# Patient Record
Sex: FEMALE | Race: WHITE | NOT HISPANIC OR LATINO | Employment: OTHER | URBAN - NONMETROPOLITAN AREA
[De-identification: names, ages, dates, MRNs, and addresses within clinical notes are randomized per-mention and may not be internally consistent; named-entity substitution may affect disease eponyms.]

---

## 2018-02-22 ENCOUNTER — OUTSIDE FACILITY SERVICE (OUTPATIENT)
Dept: FAMILY MEDICINE CLINIC | Facility: CLINIC | Age: 83
End: 2018-02-22

## 2018-02-22 PROCEDURE — 99309 SBSQ NF CARE MODERATE MDM 30: CPT | Performed by: NURSE PRACTITIONER

## 2018-02-27 ENCOUNTER — OUTSIDE FACILITY SERVICE (OUTPATIENT)
Dept: INTERNAL MEDICINE | Facility: CLINIC | Age: 83
End: 2018-02-27

## 2018-02-27 PROCEDURE — 99309 SBSQ NF CARE MODERATE MDM 30: CPT | Performed by: FAMILY MEDICINE

## 2018-03-27 ENCOUNTER — OUTSIDE FACILITY SERVICE (OUTPATIENT)
Dept: INTERNAL MEDICINE | Facility: CLINIC | Age: 83
End: 2018-03-27

## 2018-03-27 PROCEDURE — 99309 SBSQ NF CARE MODERATE MDM 30: CPT | Performed by: FAMILY MEDICINE

## 2018-04-12 ENCOUNTER — OUTSIDE FACILITY SERVICE (OUTPATIENT)
Dept: FAMILY MEDICINE CLINIC | Facility: CLINIC | Age: 83
End: 2018-04-12

## 2018-04-12 PROCEDURE — 99308 SBSQ NF CARE LOW MDM 20: CPT | Performed by: NURSE PRACTITIONER

## 2018-04-17 ENCOUNTER — OUTSIDE FACILITY SERVICE (OUTPATIENT)
Dept: INTERNAL MEDICINE | Facility: CLINIC | Age: 83
End: 2018-04-17

## 2018-04-17 PROCEDURE — 99309 SBSQ NF CARE MODERATE MDM 30: CPT | Performed by: FAMILY MEDICINE

## 2018-05-17 ENCOUNTER — OUTSIDE FACILITY SERVICE (OUTPATIENT)
Dept: FAMILY MEDICINE CLINIC | Facility: CLINIC | Age: 83
End: 2018-05-17

## 2018-05-17 PROCEDURE — 99308 SBSQ NF CARE LOW MDM 20: CPT | Performed by: NURSE PRACTITIONER

## 2018-05-31 ENCOUNTER — OUTSIDE FACILITY SERVICE (OUTPATIENT)
Dept: FAMILY MEDICINE CLINIC | Facility: CLINIC | Age: 83
End: 2018-05-31

## 2018-05-31 PROCEDURE — 99308 SBSQ NF CARE LOW MDM 20: CPT | Performed by: NURSE PRACTITIONER

## 2018-06-11 RX ORDER — FENTANYL 25 UG/H
1 PATCH TRANSDERMAL
Qty: 20 PATCH | Refills: 0 | Status: SHIPPED | OUTPATIENT
Start: 2018-06-11 | End: 2018-07-25 | Stop reason: SDUPTHER

## 2018-06-28 ENCOUNTER — OUTSIDE FACILITY SERVICE (OUTPATIENT)
Dept: FAMILY MEDICINE CLINIC | Facility: CLINIC | Age: 83
End: 2018-06-28

## 2018-06-28 PROCEDURE — 99308 SBSQ NF CARE LOW MDM 20: CPT | Performed by: NURSE PRACTITIONER

## 2018-07-09 ENCOUNTER — NURSING HOME (OUTPATIENT)
Dept: FAMILY MEDICINE CLINIC | Facility: CLINIC | Age: 83
End: 2018-07-09

## 2018-07-09 DIAGNOSIS — R53.81 PHYSICAL DEBILITY: Chronic | ICD-10-CM

## 2018-07-09 DIAGNOSIS — I10 ESSENTIAL HYPERTENSION: Chronic | ICD-10-CM

## 2018-07-09 DIAGNOSIS — I69.320 APHASIA AS LATE EFFECT OF CEREBROVASCULAR ACCIDENT (CVA): Chronic | ICD-10-CM

## 2018-07-09 DIAGNOSIS — R13.12 OROPHARYNGEAL DYSPHAGIA: Primary | Chronic | ICD-10-CM

## 2018-07-09 DIAGNOSIS — Z74.09 IMPAIRED MOBILITY AND ADLS: Chronic | ICD-10-CM

## 2018-07-09 DIAGNOSIS — Z78.9 IMPAIRED MOBILITY AND ADLS: Chronic | ICD-10-CM

## 2018-07-09 DIAGNOSIS — I69.351 HEMIPARESIS AFFECTING RIGHT SIDE AS LATE EFFECT OF CEREBROVASCULAR ACCIDENT (CVA) (HCC): Chronic | ICD-10-CM

## 2018-07-09 PROCEDURE — 99309 SBSQ NF CARE MODERATE MDM 30: CPT | Performed by: FAMILY MEDICINE

## 2018-07-11 PROBLEM — I69.320 APHASIA AS LATE EFFECT OF CEREBROVASCULAR ACCIDENT (CVA): Chronic | Status: ACTIVE | Noted: 2018-07-11

## 2018-07-11 PROBLEM — I10 ESSENTIAL HYPERTENSION: Chronic | Status: ACTIVE | Noted: 2018-07-11

## 2018-07-11 PROBLEM — I69.351 HEMIPARESIS AFFECTING RIGHT SIDE AS LATE EFFECT OF CEREBROVASCULAR ACCIDENT (CVA) (HCC): Chronic | Status: ACTIVE | Noted: 2018-07-11

## 2018-07-11 PROBLEM — Z78.9 IMPAIRED MOBILITY AND ADLS: Chronic | Status: ACTIVE | Noted: 2018-07-11

## 2018-07-11 PROBLEM — Z74.09 IMPAIRED MOBILITY AND ADLS: Chronic | Status: ACTIVE | Noted: 2018-07-11

## 2018-07-11 PROBLEM — R13.12 OROPHARYNGEAL DYSPHAGIA: Chronic | Status: ACTIVE | Noted: 2018-07-11

## 2018-07-11 PROBLEM — R53.81 PHYSICAL DEBILITY: Chronic | Status: ACTIVE | Noted: 2018-07-11

## 2018-07-11 NOTE — PROGRESS NOTES
[facility]  Skilled Nursing Facility    Patient Name: Luba Landaverde    : 1935     DOS: 2018    Nursing Facility: []   [facility]  []   [facility]  [x]   [facility]  []   [facility]    Subjective     Chief Complaint:  CMM/LTC    History of Present Illness:   [x]  Follow Up visit for coordination of long term care issues and chronic medical management needs.    CVA with late effects/Imp Mob and ADLs/HTN/GERD/OP dysphagia/debility      Review of Systems:  [x]  A complete ROS was performed. All were (-).  []  A complete ROS was performed. All were (-) except:     Review of Systems    1. Constitutional:  2. HENT:  3. Eyes:  4. Respiratory:  5. Cardiovascular:  6. Gastrointestinal:  7. Endocrine:  8. Genitourinary:  9. Musculoskeletal:  10. Skin:  11. Neurological:  12. Hematological:  13. Psychiatric/Behavioral:    Past Medical History: No past medical history on file. Reviewed at facility    Past Surgical History:No past surgical history on file. Reviewed at facility    Family History: family history is not on file. Reviewed at facility    Social History:   Reviewed at facility    Allergies:  Allergies not on file Reviewed at facility, no new listings     [x]  History reviewed at skilled nursing facility.    Objective     Vital Signs:  138/70, 76 bpm, RR 20                                            LPM: ____  Weight: 133    Physical Exam:  General:   [x]  Alert   [x]  Oriented x person  [x]  No acute distress    [x]  Confused  []  Disoriented   []  Comatose      HEENT:   [x] Atraumatic  [x]  PEERLA  [x]  Oral mucosa pink and moist                Neck:      [x]  Nares patent without epistaxis  [x]  External auditory canals are patent     [x]  Tympanic membranes intact      [x]  Supple [x]  No JVD [x]  Trachea midline [x]  No thyromegaly      Psych:  [x]  Mood _________  [x]  No acute changes []  Depressed     Heart::   [x]  RRR  []  IRRR  []  Murmur ___________     Pedal Pulses:    [x]   Present  []  Absent      Lungs:   [x]   CTA  [x]  Unlabored breathing effort []  Rales, Location ___________       Abdomen:    [x]   Soft, BS x 4, NT/ND  []  Distended  []  Tender __________       Skin:  [x]  Intact, warm and dry  []  Pressure Ulcer, Location _______________     Extremities:               Neuro:     [x]  No clubbing [x]  No cyanosis  [x]  No edema    []  Good ROM actively and passively [] Symmetric muscle strength and                                                                 development     []  Edema, Location  []  Pitting  Frail build    [x]  Normal speech []  Normal mental status [x] Cranial nerves II through                                                                                XII intact   [x]  No anosmia [x]  DTR 2+ [x]  Proprioception intact    []  No focal motor/sensory deficits  Chronic N/M def of known CVA       Pain:  []  None  [x]  Pain noted w/pain management in place       Urinary:                []  Continent  [x]  Incontinent  []  Retention  []  F/C     []  UTI w/treatment in progress              Appetite:  []  Fair  [x]  Good  []  Poor  [x]  Weight Stable     []  Weightloss  []  Unavoidable Weight Loss     []  Supplements Provided  []  Tolerating Tube Feeding              Assessment / Plan     Assessment/Problem List:    Patient Active Problem List   Diagnosis   • Hemiparesis affecting right side as late effect of cerebrovascular accident (CVA) (CMS/HCC)   • Oropharyngeal dysphagia   • Essential hypertension   • Aphasia as late effect of cerebrovascular accident (CVA)   • Impaired mobility and ADLs   • Physical debility       Plan:  Planned continuation of SC; no rep of focal ASP, cont precautions; BP at goal; mood relatively stable, cont CBT as needed for anxiety.      [x] Medication Review: I have reviewed the patients active and prn medications at Skilled Nursing Facility      []   PT/OT Reviewed   []  Order Changes     [x] I have personally reviewed and interpreted  available lab data, radiology studies and ECG obtained at time of visit.       []   Discharge Plans Reviewed      [x]  Plan of Care Reviewed  []  Discharge Summary Reviewed      [x]  Discussed Patient in detail with nursing/staff, addressed all needs today.    Discussed plan of care in detail with pt today; pt verb understanding and agrees; counseled for approx 15 min of total 25 min total exam time.    Morales Chris DO 07/11/18 12:58 PM    EMR Dragon/Transcription disclaimer:   Much of this encounter note is an electronic transcription/translation of spoken language to printed text. The electronic translation of spoken language may permit erroneous, or at times, nonsensical words or phrases to be inadvertently transcribed; Although I have reviewed the note for such errors, some may still exist.

## 2018-07-16 ENCOUNTER — NURSING HOME (OUTPATIENT)
Dept: FAMILY MEDICINE CLINIC | Facility: CLINIC | Age: 83
End: 2018-07-16

## 2018-07-16 DIAGNOSIS — I69.351 HEMIPARESIS AFFECTING RIGHT SIDE AS LATE EFFECT OF CEREBROVASCULAR ACCIDENT (CVA) (HCC): Chronic | ICD-10-CM

## 2018-07-16 DIAGNOSIS — I69.320 APHASIA AS LATE EFFECT OF CEREBROVASCULAR ACCIDENT (CVA): Chronic | ICD-10-CM

## 2018-07-16 DIAGNOSIS — R53.81 PHYSICAL DEBILITY: Chronic | ICD-10-CM

## 2018-07-16 DIAGNOSIS — Z93.1 GASTROSTOMY STATUS (HCC): Chronic | ICD-10-CM

## 2018-07-16 DIAGNOSIS — Z74.09 IMPAIRED MOBILITY AND ADLS: Primary | Chronic | ICD-10-CM

## 2018-07-16 DIAGNOSIS — Z78.9 IMPAIRED MOBILITY AND ADLS: Primary | Chronic | ICD-10-CM

## 2018-07-16 DIAGNOSIS — R13.12 OROPHARYNGEAL DYSPHAGIA: Chronic | ICD-10-CM

## 2018-07-16 DIAGNOSIS — I10 ESSENTIAL HYPERTENSION: Chronic | ICD-10-CM

## 2018-07-16 PROCEDURE — 99309 SBSQ NF CARE MODERATE MDM 30: CPT | Performed by: FAMILY MEDICINE

## 2018-07-18 PROBLEM — Z93.1 GASTROSTOMY STATUS (HCC): Chronic | Status: ACTIVE | Noted: 2018-07-18

## 2018-07-18 NOTE — PROGRESS NOTES
Nursing Home Progress Note      Patient Name: Luba Landaverde   YOB: 1935    Date of Service: 07/16/2018      Facility: [place] []   [facility] []   [facility] []   [facility] [x]   Other []       CHIEF COMPLAINT:  CMM/LTC     HISTORY OF PRESENT ILLNESS:  [x]  Follow Up visit for coordination of long term care issues and chronic medical management needs.    Hypertension/oral pharyngeal dysphasia/hemiparesis of right side secondary to CVA/aphasia secondary to CVA/impaired mobility and ADLs    PAST MEDICAL & SURGICAL HISTORY:  No past medical history on file.   No past surgical history on file.     MEDICATIONS:  Medication administration record for Luba Landaverde reviewed and reconciled today.    ALLERGIES:  Allergies not on file     REVIEW OF SYSTEMS:  • Appetite: Fair []   Good []   Poor []   Weight Loss []  []  Weight Stable   Unavoidable Weight Loss []  Tolerating Tube Feeding [x]    Supplements Provided []   • Constitutional: Negative for fever, chills, diaphoresis or fatigue and weight change.   • HENT: No dysphagia; no changes to vision/hearing/smell/taste; no epistaxis  • Eyes: Negative for redness and visual disturbance.   • Respiratory: Negative for shortness of breath, chest pain, cough or chest tightness.   • Cardiovascular: Negative for chest pain and palpitations.   • Gastrointestinal: Negative for abdominal distention, abdominal pain and blood in stool.   • Endocrine: Negative for cold intolerance and heat intolerance.   • Genitourinary: Negative for difficulty urinating, dysuria and frequency.Negative for hematuria   • Musculoskeletal: Chronic myalgias and arthralgias  • Integumentary: No open wounds, rash or concerning skin lesions  • Neurological: Negative for syncope, weakness and headaches.   • Hematological: Negative for adenopathy. Does not bruise/bleed easily.   • Immunological: Negative for reported allergies or immunological disorders  • Psychological: No depression, anxiety or  suicidal ideation    PHYSICAL EXAMINATION:  VITAL SIGNS: /64, HR 80 bpm, RR 18, weight 132    General Appearance:  [x]  Alert   [x]  Oriented x person  [x]  No acute distress    [x]  Confused  []  Disoriented   []  Comatose   Head:  Atraumatic and normocephalic, without obvious abnormality.   Eyes:          PERRLA, conjunctivae and sclerae normal, no Icterus. No pallor. Extra-occular movements are within normal limits.   Ears:  Ears appear intact with no abnormalities noted.   Throat: No oral lesions, no thrush, oral mucosa moist.   Neck: Supple, trachea midline, no thyromegaly, no carotid bruit.   Back:   No kyphoscoliosis. No tenderness to palpation.   Lungs:   Chest shape is normal.  Audible air exchange noted all lung fields.  No wheezing.    Heart:  Normal S1 and S2, no murmur, no gallop, no rub. No JVD.   Abdomen:   Normal bowel sounds, no masses, no organomegaly. Soft, non-tender, non-distended, no guarding .  G-tube functioning and in good position.     Extremities: Chronic neuromuscular deficits of known CVA, cyanosis or clubbing.  Frail build.    Pulses: Pulses palpable and equal bilaterally.   Skin: No bleeding or rash.  Generalized dry skin noted.  Age-related atrophy of skin.   Neurologic: [] Normal speech []  Normal mental status    [x] Cranial nerves II through XII intact   [x]  No anosmia [x]  DTR 2+ [x]  Proprioception intact  []  No focal motor/sensory deficits  Chronic neuromuscular/aphasic deficits of CVA     Psych/Mood:        [x]  No acute changes []  Depressed  Urinary:        []  Continent  [x]  Incontinent  []  Retention  []  F/C     []  UTI w/treatment in progress  RECORD REVIEW:   • Consult notes []   • Admission and subsequent notes []   •  [x] I have personally reviewed and interpreted available lab data, radiology studies and ECG obtained at time of visit.  [x] Medication Review: I have reviewed the patients active and prn medications at [facility]     ASSESSMENT    Diagnoses and all orders for this visit:    Impaired mobility and ADLs    Hemiparesis affecting right side as late effect of cerebrovascular accident (CVA) (CMS/HCC)    Oropharyngeal dysphagia    Essential hypertension    Aphasia as late effect of cerebrovascular accident (CVA)    Physical debility    •     PLAN  Continue supportive care as needed for ADLs/mobility/transfers.  Tolerating tube feeding regimen without difficulty at this time, no reports of residuals.  No reports of any focal aspirations, continue aspiration precautions.  Blood pressure is at goal.  Routine surveillance labs as needed.    [x]  Discussed Patient in detail with nursing/staff, addressed all needs today.     [x]  Plan of Care Reviewed   []   PT/OT Reviewed   []  Order Changes  []   Discharge Plans Reviewed         Total face to face time spent with patient 25 minutes, 15 min in counseling.    Morales Chris DO.  7/18/2018

## 2018-07-23 ENCOUNTER — NURSING HOME (OUTPATIENT)
Dept: FAMILY MEDICINE CLINIC | Facility: CLINIC | Age: 83
End: 2018-07-23

## 2018-07-23 DIAGNOSIS — I10 ESSENTIAL HYPERTENSION: Chronic | ICD-10-CM

## 2018-07-23 DIAGNOSIS — Z78.9 IMPAIRED MOBILITY AND ADLS: Chronic | ICD-10-CM

## 2018-07-23 DIAGNOSIS — I69.351 HEMIPARESIS AFFECTING RIGHT SIDE AS LATE EFFECT OF CEREBROVASCULAR ACCIDENT (CVA) (HCC): Chronic | ICD-10-CM

## 2018-07-23 DIAGNOSIS — Z74.09 IMPAIRED MOBILITY AND ADLS: Chronic | ICD-10-CM

## 2018-07-23 DIAGNOSIS — I69.320 APHASIA AS LATE EFFECT OF CEREBROVASCULAR ACCIDENT (CVA): Primary | Chronic | ICD-10-CM

## 2018-07-23 DIAGNOSIS — R13.12 OROPHARYNGEAL DYSPHAGIA: Chronic | ICD-10-CM

## 2018-07-23 DIAGNOSIS — Z93.1 GASTROSTOMY STATUS (HCC): Chronic | ICD-10-CM

## 2018-07-23 DIAGNOSIS — R53.81 PHYSICAL DEBILITY: Chronic | ICD-10-CM

## 2018-07-23 PROCEDURE — 99309 SBSQ NF CARE MODERATE MDM 30: CPT | Performed by: FAMILY MEDICINE

## 2018-07-25 RX ORDER — FENTANYL 25 UG/H
1 PATCH TRANSDERMAL
Qty: 20 PATCH | Refills: 0 | Status: SHIPPED | OUTPATIENT
Start: 2018-07-25 | End: 2018-10-08 | Stop reason: SDUPTHER

## 2018-07-25 NOTE — PROGRESS NOTES
Nursing Home Progress Note      Patient Name: Luba Landaverde   YOB: 1935    Date of Service: 07/23/2018      Facility: [place] []   [place] []   [facility] []   [facility] [x]   Other []       CHIEF COMPLAINT:  CMM/LTC     HISTORY OF PRESENT ILLNESS:  [x]  Follow Up visit for coordination of long term care issues and chronic medical management needs.    Hypertension/oral pharyngeal dysphasia/hemiparesis of right side secondary to CVA/aphasia secondary to CVA/impaired mobility and ADLs    PAST MEDICAL & SURGICAL HISTORY:  No past medical history on file.   No past surgical history on file.     MEDICATIONS:  Medication administration record for Luba Landaverde reviewed and reconciled today.    ALLERGIES:  Allergies not on file     REVIEW OF SYSTEMS:  • Appetite: Fair []   Good []   Poor []   Weight Loss []  []  Weight Stable   Unavoidable Weight Loss []  Tolerating Tube Feeding [x]    Supplements Provided []   • Constitutional: Negative for fever, chills, diaphoresis or fatigue and weight change.   • HENT: No dysphagia; no changes to vision/hearing/smell/taste; no epistaxis  • Eyes: Negative for redness and visual disturbance.   • Respiratory: Negative for shortness of breath, chest pain, cough or chest tightness.   • Cardiovascular: Negative for chest pain and palpitations.   • Gastrointestinal: Negative for abdominal distention, abdominal pain and blood in stool.   • Endocrine: Negative for cold intolerance and heat intolerance.   • Genitourinary: Negative for difficulty urinating, dysuria and frequency.Negative for hematuria   • Musculoskeletal: Chronic myalgias and arthralgias  • Integumentary: No open wounds, rash or concerning skin lesions  • Neurological: Negative for syncope, weakness and headaches.   • Hematological: Negative for adenopathy. Does not bruise/bleed easily.   • Immunological: Negative for reported allergies or immunological disorders  • Psychological: No depression, anxiety or  suicidal ideation    PHYSICAL EXAMINATION:  VITAL SIGNS: /70, HR 76 bpm, RR 20, weight 132    General Appearance:  [x]  Alert   [x]  Oriented x person  [x]  No acute distress    [x]  Confused  []  Disoriented   []  Comatose   Head:  Atraumatic and normocephalic, without obvious abnormality.   Eyes:          PERRLA, conjunctivae and sclerae normal, no Icterus. No pallor. Extra-occular movements are within normal limits.   Ears:  Ears appear intact with no abnormalities noted.   Throat: No oral lesions, no thrush, oral mucosa moist.   Neck: Supple, trachea midline, no thyromegaly, no carotid bruit.   Back:   No kyphoscoliosis. No tenderness to palpation.   Lungs:   Chest shape is normal.  Audible air exchange noted all lung fields.  No wheezing.    Heart:  Normal S1 and S2, no murmur, no gallop, no rub. No JVD.   Abdomen:   Normal bowel sounds, no masses, no organomegaly. Soft, non-tender, non-distended, no guarding .  G-tube functioning and in good position.     Extremities: Chronic neuromuscular deficits of known CVA, cyanosis or clubbing.  Frail build.    Pulses: Pulses palpable and equal bilaterally.   Skin: No bleeding or rash.  Generalized dry skin noted.  Age-related atrophy of skin.   Neurologic: [] Normal speech []  Normal mental status    [x] Cranial nerves II through XII intact   [x]  No anosmia [x]  DTR 2+ [x]  Proprioception intact  []  No focal motor/sensory deficits  Chronic neuromuscular/aphasic deficits of CVA     Psych/Mood:        [x]  No acute changes []  Depressed  Urinary:        []  Continent  [x]  Incontinent  []  Retention  []  F/C     []  UTI w/treatment in progress  RECORD REVIEW:   • Consult notes []   • Admission and subsequent notes []   •  [x] I have personally reviewed and interpreted available lab data, radiology studies and ECG obtained at time of visit.  [x] Medication Review: I have reviewed the patients active and prn medications at [facility]     ASSESSMENT    Diagnoses and all orders for this visit:    Aphasia as late effect of cerebrovascular accident (CVA)    Hemiparesis affecting right side as late effect of cerebrovascular accident (CVA) (CMS/HCC)    Impaired mobility and ADLs    Physical debility    Gastrostomy status (CMS/HCC)    Oropharyngeal dysphagia    Essential hypertension        PLAN  Continue supportive care as needed for ADLs/mobility/transfers.  Tolerating tube feeding regimen without difficulty at this time, no reports of residuals.  No reports of any focal aspirations, continue aspiration precautions.  Blood pressure is at goal.  Routine surveillance labs.    [x]  Discussed Patient in detail with nursing/staff, addressed all needs today.     [x]  Plan of Care Reviewed   []   PT/OT Reviewed   []  Order Changes  []   Discharge Plans Reviewed         Total face to face time spent with patient 25 minutes, 15 min in counseling.    Morales Chris DO.  7/25/2018

## 2018-07-30 ENCOUNTER — NURSING HOME (OUTPATIENT)
Dept: FAMILY MEDICINE CLINIC | Facility: CLINIC | Age: 83
End: 2018-07-30

## 2018-07-30 DIAGNOSIS — Z74.09 IMPAIRED MOBILITY AND ADLS: Chronic | ICD-10-CM

## 2018-07-30 DIAGNOSIS — R13.12 OROPHARYNGEAL DYSPHAGIA: Chronic | ICD-10-CM

## 2018-07-30 DIAGNOSIS — I69.351 HEMIPARESIS AFFECTING RIGHT SIDE AS LATE EFFECT OF CEREBROVASCULAR ACCIDENT (CVA) (HCC): Primary | Chronic | ICD-10-CM

## 2018-07-30 DIAGNOSIS — Z78.9 IMPAIRED MOBILITY AND ADLS: Chronic | ICD-10-CM

## 2018-07-30 DIAGNOSIS — I69.320 APHASIA AS LATE EFFECT OF CEREBROVASCULAR ACCIDENT (CVA): Chronic | ICD-10-CM

## 2018-07-30 DIAGNOSIS — Z93.1 GASTROSTOMY STATUS (HCC): Chronic | ICD-10-CM

## 2018-07-30 DIAGNOSIS — I10 ESSENTIAL HYPERTENSION: Chronic | ICD-10-CM

## 2018-07-30 DIAGNOSIS — R53.81 PHYSICAL DEBILITY: Chronic | ICD-10-CM

## 2018-07-30 PROCEDURE — 99309 SBSQ NF CARE MODERATE MDM 30: CPT | Performed by: FAMILY MEDICINE

## 2018-08-08 ENCOUNTER — NURSING HOME (OUTPATIENT)
Dept: FAMILY MEDICINE CLINIC | Facility: CLINIC | Age: 83
End: 2018-08-08

## 2018-08-08 DIAGNOSIS — R53.81 PHYSICAL DEBILITY: Chronic | ICD-10-CM

## 2018-08-08 DIAGNOSIS — Z78.9 IMPAIRED MOBILITY AND ADLS: Chronic | ICD-10-CM

## 2018-08-08 DIAGNOSIS — I69.320 APHASIA AS LATE EFFECT OF CEREBROVASCULAR ACCIDENT (CVA): Chronic | ICD-10-CM

## 2018-08-08 DIAGNOSIS — Z74.09 IMPAIRED MOBILITY AND ADLS: Chronic | ICD-10-CM

## 2018-08-08 DIAGNOSIS — Z93.1 GASTROSTOMY STATUS (HCC): Chronic | ICD-10-CM

## 2018-08-08 DIAGNOSIS — I10 ESSENTIAL HYPERTENSION: Chronic | ICD-10-CM

## 2018-08-08 DIAGNOSIS — I69.351 HEMIPARESIS AFFECTING RIGHT SIDE AS LATE EFFECT OF CEREBROVASCULAR ACCIDENT (CVA) (HCC): Primary | Chronic | ICD-10-CM

## 2018-08-08 DIAGNOSIS — R13.12 OROPHARYNGEAL DYSPHAGIA: Chronic | ICD-10-CM

## 2018-08-08 PROCEDURE — 99309 SBSQ NF CARE MODERATE MDM 30: CPT | Performed by: FAMILY MEDICINE

## 2018-08-15 ENCOUNTER — NURSING HOME (OUTPATIENT)
Dept: FAMILY MEDICINE CLINIC | Facility: CLINIC | Age: 83
End: 2018-08-15

## 2018-08-15 DIAGNOSIS — I10 ESSENTIAL HYPERTENSION: Chronic | ICD-10-CM

## 2018-08-15 DIAGNOSIS — I69.351 HEMIPARESIS AFFECTING RIGHT SIDE AS LATE EFFECT OF CEREBROVASCULAR ACCIDENT (CVA) (HCC): Chronic | ICD-10-CM

## 2018-08-15 DIAGNOSIS — I69.320 APHASIA AS LATE EFFECT OF CEREBROVASCULAR ACCIDENT (CVA): Primary | Chronic | ICD-10-CM

## 2018-08-15 DIAGNOSIS — R13.12 OROPHARYNGEAL DYSPHAGIA: Chronic | ICD-10-CM

## 2018-08-15 DIAGNOSIS — Z74.09 IMPAIRED MOBILITY AND ADLS: Chronic | ICD-10-CM

## 2018-08-15 DIAGNOSIS — Z93.1 GASTROSTOMY STATUS (HCC): Chronic | ICD-10-CM

## 2018-08-15 DIAGNOSIS — Z78.9 IMPAIRED MOBILITY AND ADLS: Chronic | ICD-10-CM

## 2018-08-15 DIAGNOSIS — R53.81 PHYSICAL DEBILITY: Chronic | ICD-10-CM

## 2018-08-15 PROCEDURE — 99309 SBSQ NF CARE MODERATE MDM 30: CPT | Performed by: FAMILY MEDICINE

## 2018-08-20 ENCOUNTER — OUTSIDE FACILITY SERVICE (OUTPATIENT)
Dept: FAMILY MEDICINE CLINIC | Facility: CLINIC | Age: 83
End: 2018-08-20

## 2018-08-20 PROCEDURE — 99308 SBSQ NF CARE LOW MDM 20: CPT | Performed by: NURSE PRACTITIONER

## 2018-09-05 ENCOUNTER — NURSING HOME (OUTPATIENT)
Dept: FAMILY MEDICINE CLINIC | Facility: CLINIC | Age: 83
End: 2018-09-05

## 2018-09-05 DIAGNOSIS — I63.511 ACUTE ISCHEMIC RIGHT MCA STROKE (HCC): ICD-10-CM

## 2018-09-05 DIAGNOSIS — Z74.09 IMPAIRED MOBILITY AND ADLS: Chronic | ICD-10-CM

## 2018-09-05 DIAGNOSIS — R13.12 OROPHARYNGEAL DYSPHAGIA: Chronic | ICD-10-CM

## 2018-09-05 DIAGNOSIS — Z78.9 IMPAIRED MOBILITY AND ADLS: Chronic | ICD-10-CM

## 2018-09-05 DIAGNOSIS — R53.81 PHYSICAL DEBILITY: Chronic | ICD-10-CM

## 2018-09-05 DIAGNOSIS — I69.320 APHASIA AS LATE EFFECT OF CEREBROVASCULAR ACCIDENT (CVA): Chronic | ICD-10-CM

## 2018-09-05 DIAGNOSIS — I10 ESSENTIAL HYPERTENSION: Chronic | ICD-10-CM

## 2018-09-05 DIAGNOSIS — I69.351 HEMIPARESIS AFFECTING RIGHT SIDE AS LATE EFFECT OF CEREBROVASCULAR ACCIDENT (CVA) (HCC): Primary | Chronic | ICD-10-CM

## 2018-09-05 DIAGNOSIS — Z93.1 GASTROSTOMY STATUS (HCC): Chronic | ICD-10-CM

## 2018-09-05 PROCEDURE — 99308 SBSQ NF CARE LOW MDM 20: CPT | Performed by: FAMILY MEDICINE

## 2018-09-12 NOTE — PROGRESS NOTES
Nursing Home Progress Note      Patient Name: Luba Landaverde   YOB: 1935    Date of Service: 07/30/2018      Facility: [place] []   [place] []   [facility] []   [facility] [x]   Other []       CHIEF COMPLAINT:  CMM/LTC     HISTORY OF PRESENT ILLNESS:  [x]  Follow Up visit for coordination of long term care issues and chronic medical management needs.    Hypertension/oral pharyngeal dysphasia/hemiparesis of right side secondary to CVA/aphasia secondary to CVA/impaired mobility and ADLs    PAST MEDICAL & SURGICAL HISTORY:  No past medical history on file.   No past surgical history on file.     MEDICATIONS:  Medication administration record for Luba Landaverde reviewed and reconciled today.    ALLERGIES:  Allergies not on file     REVIEW OF SYSTEMS:  • Appetite: Fair []   Good []   Poor []   Weight Loss []  []  Weight Stable   Unavoidable Weight Loss []  Tolerating Tube Feeding [x]    Supplements Provided []   • Constitutional: Negative for fever, chills, diaphoresis or fatigue and weight change.   • HENT: No dysphagia; no changes to vision/hearing/smell/taste; no epistaxis  • Eyes: Negative for redness and visual disturbance.   • Respiratory: Negative for shortness of breath, chest pain, cough or chest tightness.   • Cardiovascular: Negative for chest pain and palpitations.   • Gastrointestinal: Negative for abdominal distention, abdominal pain and blood in stool.   • Endocrine: Negative for cold intolerance and heat intolerance.   • Genitourinary: Negative for difficulty urinating, dysuria and frequency.Negative for hematuria   • Musculoskeletal: Chronic myalgias and arthralgias  • Integumentary: No open wounds, rash or concerning skin lesions  • Neurological: Negative for syncope, weakness and headaches.   • Hematological: Negative for adenopathy. Does not bruise/bleed easily.   • Immunological: Negative for reported allergies or immunological disorders  • Psychological: No depression, anxiety or  suicidal ideation    PHYSICAL EXAMINATION:  VITAL SIGNS: /64, HR 72 bpm, RR 18, weight 128    General Appearance:  [x]  Alert   [x]  Oriented x person  [x]  No acute distress    [x]  Confused  []  Disoriented   []  Comatose   Head:  Atraumatic and normocephalic, without obvious abnormality.   Eyes:          PERRLA, conjunctivae and sclerae normal, no Icterus. No pallor. Extra-occular movements are within normal limits.   Ears:  Ears appear intact with no abnormalities noted.   Throat: No oral lesions, no thrush, oral mucosa moist.   Neck: Supple, trachea midline, no thyromegaly, no carotid bruit.   Back:   No kyphoscoliosis. No tenderness to palpation.   Lungs:   Chest shape is normal.  Audible air exchange noted all lung fields.  No wheezing.    Heart:  Normal S1 and S2, no murmur, no gallop, no rub. No JVD.   Abdomen:   Normal bowel sounds, no masses, no organomegaly. Soft, non-tender, non-distended, no guarding .  G-tube functioning and in good position.     Extremities: Chronic neuromuscular deficits of known CVA, cyanosis or clubbing.  Frail build.    Pulses: Pulses palpable and equal bilaterally.   Skin: No bleeding or rash.  Generalized dry skin noted.  Age-related atrophy of skin.   Neurologic: [] Normal speech []  Normal mental status    [x] Cranial nerves II through XII intact   [x]  No anosmia [x]  DTR 2+ [x]  Proprioception intact  []  No focal motor/sensory deficits  Chronic neuromuscular/aphasic deficits of CVA     Psych/Mood:        [x]  No acute changes []  Depressed  Urinary:        []  Continent  [x]  Incontinent  []  Retention  []  F/C     []  UTI w/treatment in progress  RECORD REVIEW:   • Consult notes []   • Admission and subsequent notes []   •  [x] I have personally reviewed and interpreted available lab data, radiology studies and ECG obtained at time of visit.  [x] Medication Review: I have reviewed the patients active and prn medications at [facility]     ASSESSMENT    Diagnoses and all orders for this visit:    Hemiparesis affecting right side as late effect of cerebrovascular accident (CVA) (CMS/HCC)    Aphasia as late effect of cerebrovascular accident (CVA)    Impaired mobility and ADLs    Physical debility    Gastrostomy status (CMS/HCC)    Oropharyngeal dysphagia    Essential hypertension        PLAN  Continue supportive care as needed for ADLs/mobility/transfers.  Tolerating tube feeding regimen without difficulty at this time, no reports of residuals.  No reports of any focal aspirations, continue aspiration precautions.  Blood pressure is at goal.  Routine surveillance labs to continue.  Noted fluctuation in weight, we'll continue to follow clinically.    [x]  Discussed Patient in detail with nursing/staff, addressed all needs today.     [x]  Plan of Care Reviewed   []   PT/OT Reviewed   []  Order Changes  []   Discharge Plans Reviewed         Total face to face time spent with patient 25 minutes, 15 min in counseling.    Morales Chris DO.  7/30/2018

## 2018-09-19 ENCOUNTER — NURSING HOME (OUTPATIENT)
Dept: FAMILY MEDICINE CLINIC | Facility: CLINIC | Age: 83
End: 2018-09-19

## 2018-09-19 DIAGNOSIS — Z93.1 GASTROSTOMY STATUS (HCC): Chronic | ICD-10-CM

## 2018-09-19 DIAGNOSIS — R53.81 PHYSICAL DEBILITY: Primary | Chronic | ICD-10-CM

## 2018-09-19 DIAGNOSIS — I69.320 APHASIA AS LATE EFFECT OF CEREBROVASCULAR ACCIDENT (CVA): Chronic | ICD-10-CM

## 2018-09-19 DIAGNOSIS — R13.12 OROPHARYNGEAL DYSPHAGIA: Chronic | ICD-10-CM

## 2018-09-19 DIAGNOSIS — Z74.09 IMPAIRED MOBILITY AND ADLS: Chronic | ICD-10-CM

## 2018-09-19 DIAGNOSIS — I69.351 HEMIPARESIS AFFECTING RIGHT SIDE AS LATE EFFECT OF CEREBROVASCULAR ACCIDENT (CVA) (HCC): Chronic | ICD-10-CM

## 2018-09-19 DIAGNOSIS — I10 ESSENTIAL HYPERTENSION: Chronic | ICD-10-CM

## 2018-09-19 DIAGNOSIS — Z78.9 IMPAIRED MOBILITY AND ADLS: Chronic | ICD-10-CM

## 2018-09-19 DIAGNOSIS — I63.511 ACUTE ISCHEMIC RIGHT MCA STROKE (HCC): ICD-10-CM

## 2018-09-19 PROCEDURE — 99309 SBSQ NF CARE MODERATE MDM 30: CPT | Performed by: FAMILY MEDICINE

## 2018-09-19 NOTE — PROGRESS NOTES
Nursing Home Progress Note      Patient Name: Luba Landaverde   YOB: 1935    Date of Service: 8/8/2018      Facility: [place] []   [place] []   [facility] []   [facility] [x]   Other []       CHIEF COMPLAINT:  CMM/LTC     HISTORY OF PRESENT ILLNESS:  [x]  Follow Up visit for coordination of long term care issues and chronic medical management needs.    Hypertension/oral pharyngeal dysphasia/hemiparesis of right side secondary to CVA/aphasia secondary to CVA/impaired mobility and ADLs    PAST MEDICAL & SURGICAL HISTORY:  No past medical history on file.   No past surgical history on file.     MEDICATIONS:  Medication administration record for Luba Landaverde reviewed and reconciled today.    ALLERGIES:  Allergies not on file     REVIEW OF SYSTEMS:  • Appetite: Fair []   Good []   Poor []   Weight Loss []  []  Weight Stable   Unavoidable Weight Loss []  Tolerating Tube Feeding [x]    Supplements Provided []   • Constitutional: Negative for fever, chills, diaphoresis or fatigue and weight change.   • HENT: No dysphagia; no changes to vision/hearing/smell/taste; no epistaxis  • Eyes: Negative for redness and visual disturbance.   • Respiratory: Negative for shortness of breath, chest pain, cough or chest tightness.   • Cardiovascular: Negative for chest pain and palpitations.   • Gastrointestinal: Negative for abdominal distention, abdominal pain and blood in stool.   • Endocrine: Negative for cold intolerance and heat intolerance.   • Genitourinary: Negative for difficulty urinating, dysuria and frequency.Negative for hematuria   • Musculoskeletal: Chronic myalgias and arthralgias  • Integumentary: No open wounds, rash or concerning skin lesions  • Neurological: Negative for syncope, weakness and headaches.   • Hematological: Negative for adenopathy. Does not bruise/bleed easily.   • Immunological: Negative for reported allergies or immunological disorders  • Psychological: No depression, anxiety or  suicidal ideation    PHYSICAL EXAMINATION:  VITAL SIGNS: BP 1:30/68, HR 66 BPM, RR 18, weight 128    General Appearance:  [x]  Alert   [x]  Oriented x person  [x]  No acute distress    [x]  Confused  []  Disoriented   []  Comatose   Head:  Atraumatic and normocephalic, without obvious abnormality.   Eyes:          PERRLA, conjunctivae and sclerae normal, no Icterus. No pallor. Extra-occular movements are within normal limits.   Ears:  Ears appear intact with no abnormalities noted.   Throat: No oral lesions, no thrush, oral mucosa moist.   Neck: Supple, trachea midline, no thyromegaly, no carotid bruit.   Back:   No kyphoscoliosis. No tenderness to palpation.   Lungs:   Chest shape is normal.  Audible air exchange noted all lung fields.  No wheezing.    Heart:  Normal S1 and S2, no murmur, no gallop, no rub. No JVD.   Abdomen:   Normal bowel sounds, no masses, no organomegaly. Soft, non-tender, non-distended, no guarding .  G-tube functioning and in good position.     Extremities: Chronic neuromuscular deficits of known CVA, cyanosis or clubbing.  Frail build.    Pulses: Pulses palpable and equal bilaterally.   Skin: No bleeding or rash.  Generalized dry skin noted.  Age-related atrophy of skin.   Neurologic: [] Normal speech []  Normal mental status    [x] Cranial nerves II through XII intact   [x]  No anosmia [x]  DTR 2+ [x]  Proprioception intact  []  No focal motor/sensory deficits  Chronic neuromuscular/aphasic deficits of CVA     Psych/Mood:        [x]  No acute changes []  Depressed  Urinary:        []  Continent  [x]  Incontinent  []  Retention  []  F/C     []  UTI w/treatment in progress  RECORD REVIEW:   • Consult notes []   • Admission and subsequent notes []   •  [x] I have personally reviewed and interpreted available lab data, radiology studies and ECG obtained at time of visit.  [x] Medication Review: I have reviewed the patients active and prn medications at [facility]     ASSESSMENT    Diagnoses and all orders for this visit:    Hemiparesis affecting right side as late effect of cerebrovascular accident (CVA) (CMS/HCC)    Oropharyngeal dysphagia    Gastrostomy status (CMS/HCC)    Physical debility    Impaired mobility and ADLs    Aphasia as late effect of cerebrovascular accident (CVA)    Essential hypertension        PLAN  Continue supportive care as needed for ADLs/mobility/transfers.  Tolerating tube feeding regimen without difficulty at this time, no reports of residuals.  No reports of any focal aspirations, continue aspiration precautions.  Blood pressure is at goal on review of vital signs.  Routine surveillance labs.  No fluctuation in weight, we'll continue to follow clinically.    [x]  Discussed Patient in detail with nursing/staff, addressed all needs today.     [x]  Plan of Care Reviewed   []   PT/OT Reviewed   []  Order Changes  []   Discharge Plans Reviewed         Total face to face time spent with patient 25 minutes, 15 min in counseling.    Morales Chris DO.  8/8/2018

## 2018-10-03 ENCOUNTER — NURSING HOME (OUTPATIENT)
Dept: FAMILY MEDICINE CLINIC | Facility: CLINIC | Age: 83
End: 2018-10-03

## 2018-10-03 DIAGNOSIS — I69.351 HEMIPARESIS AFFECTING RIGHT SIDE AS LATE EFFECT OF CEREBROVASCULAR ACCIDENT (CVA) (HCC): Chronic | ICD-10-CM

## 2018-10-03 DIAGNOSIS — I69.320 APHASIA AS LATE EFFECT OF CEREBROVASCULAR ACCIDENT (CVA): Chronic | ICD-10-CM

## 2018-10-03 DIAGNOSIS — Z93.1 GASTROSTOMY STATUS (HCC): Chronic | ICD-10-CM

## 2018-10-03 DIAGNOSIS — I10 ESSENTIAL HYPERTENSION: Chronic | ICD-10-CM

## 2018-10-03 DIAGNOSIS — R13.12 OROPHARYNGEAL DYSPHAGIA: Chronic | ICD-10-CM

## 2018-10-03 DIAGNOSIS — R53.81 PHYSICAL DEBILITY: Chronic | ICD-10-CM

## 2018-10-03 DIAGNOSIS — Z78.9 IMPAIRED MOBILITY AND ADLS: Chronic | ICD-10-CM

## 2018-10-03 DIAGNOSIS — Z74.09 IMPAIRED MOBILITY AND ADLS: Chronic | ICD-10-CM

## 2018-10-03 DIAGNOSIS — I63.511 ACUTE ISCHEMIC RIGHT MCA STROKE (HCC): Primary | ICD-10-CM

## 2018-10-03 PROCEDURE — 99309 SBSQ NF CARE MODERATE MDM 30: CPT | Performed by: FAMILY MEDICINE

## 2018-10-08 ENCOUNTER — NURSING HOME (OUTPATIENT)
Dept: FAMILY MEDICINE CLINIC | Facility: CLINIC | Age: 83
End: 2018-10-08

## 2018-10-08 DIAGNOSIS — I69.351 HEMIPARESIS AFFECTING RIGHT SIDE AS LATE EFFECT OF CEREBROVASCULAR ACCIDENT (CVA) (HCC): Chronic | ICD-10-CM

## 2018-10-08 DIAGNOSIS — I10 ESSENTIAL HYPERTENSION: Chronic | ICD-10-CM

## 2018-10-08 DIAGNOSIS — Z74.09 IMPAIRED MOBILITY AND ADLS: Chronic | ICD-10-CM

## 2018-10-08 DIAGNOSIS — R13.12 OROPHARYNGEAL DYSPHAGIA: Chronic | ICD-10-CM

## 2018-10-08 DIAGNOSIS — Z93.1 GASTROSTOMY STATUS (HCC): Chronic | ICD-10-CM

## 2018-10-08 DIAGNOSIS — Z78.9 IMPAIRED MOBILITY AND ADLS: Chronic | ICD-10-CM

## 2018-10-08 PROCEDURE — 99308 SBSQ NF CARE LOW MDM 20: CPT | Performed by: NURSE PRACTITIONER

## 2018-10-08 RX ORDER — FENTANYL 25 UG/H
1 PATCH TRANSDERMAL
Qty: 20 PATCH | Refills: 0 | Status: SHIPPED | OUTPATIENT
Start: 2018-10-08

## 2018-10-08 NOTE — PROGRESS NOTES
Nursing Home Follow Up Note      Morales Chris DO []   SWETA Patel [x]  [address] [facility], [place] [address]  Phone: [phone]  Fax: [phone] Ngoc Onofre MD []    Shane Barber DO []   [address] [place] [address]  Phone: [phone]  Fax: [phone]     PATIENT NAME: Luba Landaverde                                                                          YOB: 1935           DATE OF SERVICE: 10/08/2018  FACILITY:  [][place]   [] [place]   [] [facility]   [x] [facility]   [] Other ______________________________________________________________________      CHIEF COMPLAINT:  CMM & LTC      HISTORY OF PRESENT ILLNESS:   Routine visit for coordination of long term care needs and management of chronic conditions    PAST MEDICAL & SURGICAL HISTORY:   No past medical history on file.   No past surgical history on file.      MEDICATIONS:  I have reviewed and reconciled the patients medication list in the patients chart at the skilled nursing facility today.      ALLERGIES:  Allergies   Allergen Reactions   • Codeine Unknown (See Comments)     unknown   • Morphine Unknown (See Comments)     unknown   • Penicillins Hives   • Sulfa Antibiotics Rash         SOCIAL HISTORY:    Social History     Social History   • Marital status:      Spouse name: N/A   • Number of children: N/A   • Years of education: N/A     Occupational History   • Not on file.     Social History Main Topics   • Smoking status: Not on file   • Smokeless tobacco: Not on file   • Alcohol use Not on file   • Drug use: Unknown   • Sexual activity: Not on file     Other Topics Concern   • Not on file     Social History Narrative   • No narrative on file       FAMILY HISTORY:    No family history on file.    REVIEW OF SYSTEMS:    Review of Systems   Constitutional: Negative for activity change, appetite change, chills, diaphoresis, fatigue, fever, unexpected weight gain and unexpected weight loss.    HENT: Negative for congestion, ear pain, mouth sores, nosebleeds, postnasal drip, rhinorrhea, sinus pressure, sneezing, sore throat and trouble swallowing.    Eyes: Negative for blurred vision, pain, discharge, redness, itching and visual disturbance.   Respiratory: Negative for apnea, cough, choking, chest tightness, shortness of breath, wheezing and stridor.    Cardiovascular: Negative for chest pain, palpitations and leg swelling.   Gastrointestinal: Negative for abdominal distention, abdominal pain, blood in stool, constipation, diarrhea, nausea, vomiting, GERD and indigestion.   Endocrine: Negative for polydipsia and polyuria.   Genitourinary: Negative for urinary incontinence, decreased urine volume, difficulty urinating, dysuria, flank pain, frequency, hematuria and urgency.   Musculoskeletal: Positive for arthralgias. Negative for back pain, gait problem, joint swelling and myalgias.   Skin: Negative for color change, dry skin, rash and skin lesions.   Allergic/Immunologic: Negative for environmental allergies.   Neurological: Positive for memory problem. Negative for dizziness, seizures, speech difficulty, weakness and confusion.   Psychiatric/Behavioral: Negative for behavioral problems, dysphoric mood, hallucinations, sleep disturbance and depressed mood. The patient is not nervous/anxious.          PHYSICAL EXAMINATION:   VITAL SIGNS: /64, HR 70, RR 18, Weight 138 pounds (up 3 pounds).    Physical Exam   Constitutional: She appears well-developed and well-nourished.   HENT:   Head: Normocephalic.   Right Ear: External ear normal.   Left Ear: External ear normal.   Nose: Nose normal.   Eyes: Conjunctivae are normal.   Neck: Normal range of motion.   Cardiovascular: Normal rate, regular rhythm, normal heart sounds and intact distal pulses.    Pulmonary/Chest: Effort normal and breath sounds normal. No respiratory distress. She has no wheezes. She has no rales.   Abdominal: Soft. Bowel sounds are  normal. She exhibits no distension and no mass. There is no tenderness. No hernia.   Musculoskeletal: She exhibits no edema.   NROM all major joints   Neurological: She is alert.   Skin: Skin is warm and dry. No rash noted.   Psychiatric: She has a normal mood and affect. Her behavior is normal.   Nursing note and vitals reviewed.      RECORDS REVIEW:   I have reviewed and interpreted the following lab test results  obtained at the time of the visit today.     ASSESSMENT   Diagnoses and all orders for this visit:    Essential hypertension    Hemiparesis affecting right side as late effect of cerebrovascular accident (CVA) (CMS/[facility])    Gastrostomy status (CMS/[facility])    Oropharyngeal dysphagia    Impaired mobility and ADLs        PLAN  Continue supportive care as needed for ADLs/mobility/transfers.  Tolerating tube feeding regimen without difficulty at this time, no reports of residuals.  No reports of any focal aspirations, continue aspiration precautions.  Blood pressure is at goal on review of vital signs. Routine surveillance labs.  No fluctuation in weight, we'll continue to follow clinically.     09/11/18 Lipid panel showed HDL 30; CBC showed platelet count of 472; TSH 3.609. 07/04/18 BMP with creatinine of 0.5 and .    [x]  Discussed Patient in detail with nursing/staff, addressed all needs today.     [x]  Plan of Care Reviewed   []  PT/OT Reviewed   []  Order Changes  []  Discharge Plans Reviewed  [x]  Advance Directive on file with Nursing Home.   [x]  POA on file with Nursing Home.   [x]  Code Status listed: []  Full Code   [x]  DNR          SWETA Galloway.

## 2018-10-10 NOTE — PROGRESS NOTES
Nursing Home Progress Note      Patient Name: Luba Landaverde   YOB: 1935    Date of Service: 8/15/2018      Facility: [facility] []   [facility] []   [facility] []   [facility] [x]   Other []       CHIEF COMPLAINT:  CMM/LTC     HISTORY OF PRESENT ILLNESS:  [x]  Follow Up visit for coordination of long term care issues and chronic medical management needs.    Hypertension/oral pharyngeal dysphasia/hemiparesis of right side secondary to CVA/aphasia secondary to CVA/impaired mobility and ADLs    PAST MEDICAL & SURGICAL HISTORY:  No past medical history on file.   No past surgical history on file.     MEDICATIONS:  Medication administration record for Luba Landaverde reviewed and reconciled today.    ALLERGIES:  Allergies   Allergen Reactions   • Codeine Unknown (See Comments)     unknown   • Morphine Unknown (See Comments)     unknown   • Penicillins Hives   • Sulfa Antibiotics Rash        REVIEW OF SYSTEMS:  • Appetite: Fair []   Good []   Poor []   Weight Loss []  []  Weight Stable   Unavoidable Weight Loss []  Tolerating Tube Feeding [x]    Supplements Provided []   • Constitutional: Negative for fever, chills, diaphoresis or fatigue and weight change.   • HENT: No dysphagia; no changes to vision/hearing/smell/taste; no epistaxis  • Eyes: Negative for redness and visual disturbance.   • Respiratory: Negative for shortness of breath, chest pain, cough or chest tightness.   • Cardiovascular: Negative for chest pain and palpitations.   • Gastrointestinal: Negative for abdominal distention, abdominal pain and blood in stool.   • Endocrine: Negative for cold intolerance and heat intolerance.   • Genitourinary: Negative for difficulty urinating, dysuria and frequency.Negative for hematuria   • Musculoskeletal: Chronic myalgias and arthralgias  • Integumentary: No open wounds, rash or concerning skin lesions  • Neurological: Negative for syncope, weakness and headaches.   • Hematological: Negative for adenopathy.  Does not bruise/bleed easily.   • Immunological: Negative for reported allergies or immunological disorders  • Psychological: No depression, anxiety or suicidal ideation    PHYSICAL EXAMINATION:  VITAL SIGNS: /62, HR 86 bpm, RR 20, weight 133    General Appearance:  [x]  Alert   [x]  Oriented x person  [x]  No acute distress    [x]  Confused  []  Disoriented   []  Comatose   Head:  Atraumatic and normocephalic, without obvious abnormality.   Eyes:          PERRLA, conjunctivae and sclerae normal, no Icterus. No pallor. Extra-occular movements are within normal limits.   Ears:  Ears appear intact with no abnormalities noted.   Throat: No oral lesions, no thrush, oral mucosa moist.   Neck: Supple, trachea midline, no thyromegaly, no carotid bruit.   Back:   No kyphoscoliosis. No tenderness to palpation.   Lungs:   Chest shape is normal.  Audible air exchange noted all lung fields.  No wheezing.    Heart:  Normal S1 and S2, no murmur, no gallop, no rub. No JVD.   Abdomen:   Normal bowel sounds, no masses, no organomegaly. Soft, non-tender, non-distended, no guarding .  G-tube functioning and in good position.     Extremities: Chronic neuromuscular deficits of known CVA, cyanosis or clubbing.  Frail build.    Pulses: Pulses palpable and equal bilaterally.   Skin: No bleeding or rash.  Generalized dry skin noted.  Age-related atrophy of skin.   Neurologic: [] Normal speech []  Normal mental status    [x] Cranial nerves II through XII intact   [x]  No anosmia [x]  DTR 2+ [x]  Proprioception intact  []  No focal motor/sensory deficits  Chronic neuromuscular/aphasic deficits of CVA     Psych/Mood:        [x]  No acute changes []  Depressed  Urinary:        []  Continent  [x]  Incontinent  []  Retention  []  F/C     []  UTI w/treatment in progress  RECORD REVIEW:   • Consult notes []   • Admission and subsequent notes []   •  [x] I have personally reviewed and interpreted available lab data, radiology studies and ECG  obtained at time of visit.  [x] Medication Review: I have reviewed the patients active and prn medications at [facility] Facility     ASSESSMENT   Diagnoses and all orders for this visit:    Aphasia as late effect of cerebrovascular accident (CVA)    Hemiparesis affecting right side as late effect of cerebrovascular accident (CVA) (CMS/HCC)    Oropharyngeal dysphagia    Essential hypertension    Impaired mobility and ADLs    Physical debility    Gastrostomy status (CMS/[facility])        PLAN  Continue supportive care as needed for ADLs/mobility/transfers.  Tolerating tube feeding regimen without difficulty at this time, no reports of residuals.  No reports of any focal aspirations, continue aspiration precautions.  Blood pressure is at goal on review of vital signs.  Routine surveillance labs.  Noted weight gain of 5 pounds.    [x]  Discussed Patient in detail with nursing/staff, addressed all needs today.     [x]  Plan of Care Reviewed   []   PT/OT Reviewed   []  Order Changes  []   Discharge Plans Reviewed         Total face to face time spent with patient 25 minutes, 15 min in counseling.    Morales Chris DO.  8/15/2018

## 2018-10-15 ENCOUNTER — NURSING HOME (OUTPATIENT)
Dept: FAMILY MEDICINE CLINIC | Facility: CLINIC | Age: 83
End: 2018-10-15

## 2018-10-15 DIAGNOSIS — Z78.9 IMPAIRED MOBILITY AND ADLS: Chronic | ICD-10-CM

## 2018-10-15 DIAGNOSIS — Z74.09 IMPAIRED MOBILITY AND ADLS: Chronic | ICD-10-CM

## 2018-10-15 DIAGNOSIS — I69.351 HEMIPARESIS AFFECTING RIGHT SIDE AS LATE EFFECT OF CEREBROVASCULAR ACCIDENT (CVA) (HCC): Chronic | ICD-10-CM

## 2018-10-15 DIAGNOSIS — R60.0 ARM EDEMA: Primary | ICD-10-CM

## 2018-10-15 DIAGNOSIS — I10 ESSENTIAL HYPERTENSION: Chronic | ICD-10-CM

## 2018-10-15 DIAGNOSIS — Z93.1 GASTROSTOMY STATUS (HCC): Chronic | ICD-10-CM

## 2018-10-15 DIAGNOSIS — R13.12 OROPHARYNGEAL DYSPHAGIA: Chronic | ICD-10-CM

## 2018-10-15 PROCEDURE — 99309 SBSQ NF CARE MODERATE MDM 30: CPT | Performed by: NURSE PRACTITIONER

## 2018-10-15 NOTE — PROGRESS NOTES
Nursing Home Follow Up Note      Morales Chris DO []   SWETA Patel [x]  [address] [facility], [place] [address]  Phone: [phone]  Fax: [phone] Ngoc Onofre MD []    Shane Barber DO []   [address] [place] [address]  Phone: [phone]  Fax: [phone]     PATIENT NAME: Luba Landaverde                                                                          YOB: 1935           DATE OF SERVICE: 10/15/2018  FACILITY:  [][facility] [place]   [] [facility]   [] [facility]   [x] [facility]   [] Other ______________________________________________________________________      CHIEF COMPLAINT:  Right arm edema      HISTORY OF PRESENT ILLNESS:   Edema of right arm    PAST MEDICAL & SURGICAL HISTORY:   No past medical history on file.   No past surgical history on file.      MEDICATIONS:  I have reviewed and reconciled the patients medication list in the patients chart at the skilled nursing facility today.      ALLERGIES:    Allergies   Allergen Reactions   • Codeine Unknown (See Comments)     unknown   • Morphine Unknown (See Comments)     unknown   • Penicillins Hives   • Sulfa Antibiotics Rash         SOCIAL HISTORY:    Social History     Social History   • Marital status:      Spouse name: N/A   • Number of children: N/A   • Years of education: N/A     Occupational History   • Not on file.     Social History Main Topics   • Smoking status: Not on file   • Smokeless tobacco: Not on file   • Alcohol use Not on file   • Drug use: Unknown   • Sexual activity: Not on file     Other Topics Concern   • Not on file     Social History Narrative   • No narrative on file       FAMILY HISTORY:    No family history on file.    REVIEW OF SYSTEMS:    Review of Systems   Constitutional: Negative for activity change, appetite change, chills, diaphoresis, fatigue, fever, unexpected weight gain and unexpected weight loss.   HENT: Negative for congestion, ear pain, mouth sores,  nosebleeds, postnasal drip, rhinorrhea, sinus pressure, sneezing, sore throat and trouble swallowing.    Eyes: Negative for blurred vision, pain, discharge, redness, itching and visual disturbance.   Respiratory: Negative for apnea, cough, choking, chest tightness, shortness of breath, wheezing and stridor.    Cardiovascular: Negative for chest pain, palpitations and leg swelling.   Gastrointestinal: Negative for abdominal distention, abdominal pain, blood in stool, constipation, diarrhea, nausea, vomiting, GERD and indigestion.   Endocrine: Negative for polydipsia and polyuria.   Genitourinary: Negative for urinary incontinence, decreased urine volume, difficulty urinating, dysuria, flank pain, frequency, hematuria and urgency.   Musculoskeletal: Positive for arthralgias. Negative for back pain, gait problem, joint swelling and myalgias.        Right arm edema   Skin: Negative for color change, dry skin, rash and skin lesions.   Allergic/Immunologic: Negative for environmental allergies.   Neurological: Positive for memory problem. Negative for dizziness, seizures, speech difficulty, weakness and confusion.   Psychiatric/Behavioral: Negative for behavioral problems, dysphoric mood, hallucinations, sleep disturbance and depressed mood. The patient is not nervous/anxious.          PHYSICAL EXAMINATION:   VITAL SIGNS: /72, HR 68, RR 16, Weight 128 pounds    Physical Exam   Constitutional: She appears well-developed and well-nourished.   HENT:   Head: Normocephalic.   Right Ear: External ear normal.   Left Ear: External ear normal.   Nose: Nose normal.   Eyes: Conjunctivae are normal.   Neck: Normal range of motion.   Cardiovascular: Normal rate, regular rhythm, normal heart sounds and intact distal pulses.    Pulmonary/Chest: Effort normal and breath sounds normal. No respiratory distress. She has no wheezes. She has no rales.   Abdominal: Soft. Bowel sounds are normal. She exhibits no distension and no mass.  There is no tenderness. No hernia.   Musculoskeletal: She exhibits edema (right arm).   NROM all major joints   Neurological: She is alert.   Skin: Skin is warm and dry. No rash noted.   Psychiatric: She has a normal mood and affect. Her behavior is normal.   Nursing note and vitals reviewed.      RECORDS REVIEW:   I have reviewed and interpreted the following lab test results  obtained at the time of the visit today.   09/11/18 Lipid panel, CBC, TSH all normal; 07/04/18 BMP with normal results.    ASSESSMENT     Diagnoses and all orders for this visit:    Arm edema    Essential hypertension    Oropharyngeal dysphagia    Gastrostomy status (CMS/HCC)    Impaired mobility and ADLs    Hemiparesis affecting right side as late effect of cerebrovascular accident (CVA) (CMS/[facility])        PLAN  Keep right arm elevated on a pillow. Continue supportive care as needed for ADLs/mobility/transfers.  Tolerating tube feeding regimen without difficulty at this time, no reports of residuals.  No reports of any focal aspirations, continue aspiration precautions.  Blood pressure is at goal on review of vital signs. Routine surveillance labs.  No fluctuation in weight, we'll continue to follow clinically.     [x]  Discussed Patient in detail with nursing/staff, addressed all needs today.     [x]  Plan of Care Reviewed   []  PT/OT Reviewed   [x]  Order Changes  []  Discharge Plans Reviewed  [x]  Advance Directive on file with Nursing Home.   [x]  POA on file with Nursing Home.   [x]  Code Status listed: []  Full Code   [x]  DNR          SWETA Galloway.

## 2018-10-17 ENCOUNTER — NURSING HOME (OUTPATIENT)
Dept: FAMILY MEDICINE CLINIC | Facility: CLINIC | Age: 83
End: 2018-10-17

## 2018-10-17 DIAGNOSIS — I69.320 APHASIA AS LATE EFFECT OF CEREBROVASCULAR ACCIDENT (CVA): Chronic | ICD-10-CM

## 2018-10-17 DIAGNOSIS — Z78.9 IMPAIRED MOBILITY AND ADLS: Primary | Chronic | ICD-10-CM

## 2018-10-17 DIAGNOSIS — Z74.09 IMPAIRED MOBILITY AND ADLS: Primary | Chronic | ICD-10-CM

## 2018-10-17 DIAGNOSIS — R53.81 PHYSICAL DEBILITY: Chronic | ICD-10-CM

## 2018-10-17 DIAGNOSIS — Z93.1 GASTROSTOMY STATUS (HCC): Chronic | ICD-10-CM

## 2018-10-17 DIAGNOSIS — I69.351 HEMIPARESIS AFFECTING RIGHT SIDE AS LATE EFFECT OF CEREBROVASCULAR ACCIDENT (CVA) (HCC): Chronic | ICD-10-CM

## 2018-10-17 DIAGNOSIS — I10 ESSENTIAL HYPERTENSION: Chronic | ICD-10-CM

## 2018-10-17 DIAGNOSIS — R13.12 OROPHARYNGEAL DYSPHAGIA: Chronic | ICD-10-CM

## 2018-10-17 PROCEDURE — 99308 SBSQ NF CARE LOW MDM 20: CPT | Performed by: FAMILY MEDICINE

## 2018-10-24 ENCOUNTER — NURSING HOME (OUTPATIENT)
Dept: FAMILY MEDICINE CLINIC | Facility: CLINIC | Age: 83
End: 2018-10-24

## 2018-10-24 DIAGNOSIS — Z93.1 GASTROSTOMY STATUS (HCC): Chronic | ICD-10-CM

## 2018-10-24 DIAGNOSIS — Z78.9 IMPAIRED MOBILITY AND ADLS: Primary | Chronic | ICD-10-CM

## 2018-10-24 DIAGNOSIS — R13.12 OROPHARYNGEAL DYSPHAGIA: Chronic | ICD-10-CM

## 2018-10-24 DIAGNOSIS — I10 ESSENTIAL HYPERTENSION: Chronic | ICD-10-CM

## 2018-10-24 DIAGNOSIS — R53.81 PHYSICAL DEBILITY: Chronic | ICD-10-CM

## 2018-10-24 DIAGNOSIS — I69.351 HEMIPARESIS AFFECTING RIGHT SIDE AS LATE EFFECT OF CEREBROVASCULAR ACCIDENT (CVA) (HCC): Chronic | ICD-10-CM

## 2018-10-24 DIAGNOSIS — Z74.09 IMPAIRED MOBILITY AND ADLS: Primary | Chronic | ICD-10-CM

## 2018-10-24 PROCEDURE — 99308 SBSQ NF CARE LOW MDM 20: CPT | Performed by: FAMILY MEDICINE

## 2018-10-31 ENCOUNTER — NURSING HOME (OUTPATIENT)
Dept: FAMILY MEDICINE CLINIC | Facility: CLINIC | Age: 83
End: 2018-10-31

## 2018-10-31 DIAGNOSIS — I69.320 APHASIA AS LATE EFFECT OF CEREBROVASCULAR ACCIDENT (CVA): Chronic | ICD-10-CM

## 2018-10-31 DIAGNOSIS — I10 ESSENTIAL HYPERTENSION: Chronic | ICD-10-CM

## 2018-10-31 DIAGNOSIS — I69.351 HEMIPARESIS AFFECTING RIGHT SIDE AS LATE EFFECT OF CEREBROVASCULAR ACCIDENT (CVA) (HCC): Chronic | ICD-10-CM

## 2018-10-31 DIAGNOSIS — Z93.1 GASTROSTOMY STATUS (HCC): Chronic | ICD-10-CM

## 2018-10-31 DIAGNOSIS — R53.81 PHYSICAL DEBILITY: Chronic | ICD-10-CM

## 2018-10-31 DIAGNOSIS — Z74.09 IMPAIRED MOBILITY AND ADLS: Primary | Chronic | ICD-10-CM

## 2018-10-31 DIAGNOSIS — R13.12 OROPHARYNGEAL DYSPHAGIA: Chronic | ICD-10-CM

## 2018-10-31 DIAGNOSIS — Z78.9 IMPAIRED MOBILITY AND ADLS: Primary | Chronic | ICD-10-CM

## 2018-10-31 PROCEDURE — 99308 SBSQ NF CARE LOW MDM 20: CPT | Performed by: FAMILY MEDICINE

## 2018-11-02 ENCOUNTER — APPOINTMENT (OUTPATIENT)
Dept: CT IMAGING | Facility: HOSPITAL | Age: 83
End: 2018-11-02

## 2018-11-02 ENCOUNTER — HOSPITAL ENCOUNTER (INPATIENT)
Facility: HOSPITAL | Age: 83
LOS: 1 days | Discharge: HOSPICE | End: 2018-11-03
Attending: EMERGENCY MEDICINE | Admitting: INTERNAL MEDICINE

## 2018-11-02 ENCOUNTER — APPOINTMENT (OUTPATIENT)
Dept: GENERAL RADIOLOGY | Facility: HOSPITAL | Age: 83
End: 2018-11-02

## 2018-11-02 DIAGNOSIS — I63.511 ACUTE ISCHEMIC RIGHT MCA STROKE (HCC): Primary | ICD-10-CM

## 2018-11-02 LAB
ALT SERPL W P-5'-P-CCNC: 31 U/L (ref 7–40)
APTT PPP: 28.2 SECONDS (ref 24–31)
AST SERPL-CCNC: 25 U/L (ref 0–33)
BASOPHILS # BLD AUTO: 0.08 10*3/MM3 (ref 0–0.2)
BASOPHILS NFR BLD AUTO: 0.7 % (ref 0–1)
BILIRUB UR QL STRIP: NEGATIVE
BUN BLDA-MCNC: 14 MG/DL (ref 8–26)
CA-I BLDA-SCNC: 1.29 MMOL/L (ref 1.2–1.32)
CHLORIDE BLDA-SCNC: 104 MMOL/L (ref 98–109)
CLARITY UR: CLEAR
CO2 BLDA-SCNC: 25 MMOL/L (ref 24–29)
COLOR UR: YELLOW
CREAT BLDA-MCNC: 0.5 MG/DL (ref 0.6–1.3)
DEPRECATED RDW RBC AUTO: 45.5 FL (ref 37–54)
EOSINOPHIL # BLD AUTO: 0.32 10*3/MM3 (ref 0–0.3)
EOSINOPHIL NFR BLD AUTO: 2.9 % (ref 0–3)
ERYTHROCYTE [DISTWIDTH] IN BLOOD BY AUTOMATED COUNT: 13.4 % (ref 11.3–14.5)
GLUCOSE BLDC GLUCOMTR-MCNC: 166 MG/DL (ref 70–130)
GLUCOSE UR STRIP-MCNC: NEGATIVE MG/DL
HCT VFR BLD AUTO: 47.8 % (ref 34.5–44)
HCT VFR BLDA CALC: 47 % (ref 38–51)
HGB BLD-MCNC: 15.5 G/DL (ref 11.5–15.5)
HGB BLDA-MCNC: 16 G/DL (ref 12–17)
HGB UR QL STRIP.AUTO: NEGATIVE
HOLD SPECIMEN: NORMAL
IMM GRANULOCYTES # BLD: 0.04 10*3/MM3 (ref 0–0.03)
IMM GRANULOCYTES NFR BLD: 0.4 % (ref 0–0.6)
INR PPP: 1 (ref 0.8–1.2)
KETONES UR QL STRIP: NEGATIVE
LEUKOCYTE ESTERASE UR QL STRIP.AUTO: NEGATIVE
LYMPHOCYTES # BLD AUTO: 1.95 10*3/MM3 (ref 0.6–4.8)
LYMPHOCYTES NFR BLD AUTO: 17.5 % (ref 24–44)
MCH RBC QN AUTO: 29.9 PG (ref 27–31)
MCHC RBC AUTO-ENTMCNC: 32.4 G/DL (ref 32–36)
MCV RBC AUTO: 92.1 FL (ref 80–99)
MONOCYTES # BLD AUTO: 0.94 10*3/MM3 (ref 0–1)
MONOCYTES NFR BLD AUTO: 8.4 % (ref 0–12)
NEUTROPHILS # BLD AUTO: 7.84 10*3/MM3 (ref 1.5–8.3)
NEUTROPHILS NFR BLD AUTO: 70.1 % (ref 41–71)
NITRITE UR QL STRIP: NEGATIVE
PH UR STRIP.AUTO: 6 [PH] (ref 5–8)
PLATELET # BLD AUTO: 440 10*3/MM3 (ref 150–450)
PMV BLD AUTO: 9.9 FL (ref 6–12)
POTASSIUM BLDA-SCNC: 4.2 MMOL/L (ref 3.5–4.9)
PROT UR QL STRIP: NEGATIVE
PROTHROMBIN TIME: 12.5 SECONDS (ref 12.8–15.2)
RBC # BLD AUTO: 5.19 10*6/MM3 (ref 3.89–5.14)
SODIUM BLDA-SCNC: 141 MMOL/L (ref 138–146)
SP GR UR STRIP: 1.07 (ref 1–1.03)
TROPONIN I SERPL-MCNC: 0 NG/ML (ref 0–0.07)
UROBILINOGEN UR QL STRIP: ABNORMAL
WBC NRBC COR # BLD: 11.17 10*3/MM3 (ref 3.5–10.8)
WHOLE BLOOD HOLD SPECIMEN: NORMAL
WHOLE BLOOD HOLD SPECIMEN: NORMAL

## 2018-11-02 PROCEDURE — 84460 ALANINE AMINO (ALT) (SGPT): CPT | Performed by: EMERGENCY MEDICINE

## 2018-11-02 PROCEDURE — 85025 COMPLETE CBC W/AUTO DIFF WBC: CPT | Performed by: EMERGENCY MEDICINE

## 2018-11-02 PROCEDURE — 84484 ASSAY OF TROPONIN QUANT: CPT

## 2018-11-02 PROCEDURE — 25010000002 HYDROMORPHONE PER 4 MG: Performed by: EMERGENCY MEDICINE

## 2018-11-02 PROCEDURE — 93005 ELECTROCARDIOGRAM TRACING: CPT | Performed by: EMERGENCY MEDICINE

## 2018-11-02 PROCEDURE — 70450 CT HEAD/BRAIN W/O DYE: CPT

## 2018-11-02 PROCEDURE — 80047 BASIC METABLC PNL IONIZED CA: CPT

## 2018-11-02 PROCEDURE — 71045 X-RAY EXAM CHEST 1 VIEW: CPT

## 2018-11-02 PROCEDURE — 85730 THROMBOPLASTIN TIME PARTIAL: CPT | Performed by: EMERGENCY MEDICINE

## 2018-11-02 PROCEDURE — 99285 EMERGENCY DEPT VISIT HI MDM: CPT

## 2018-11-02 PROCEDURE — 84450 TRANSFERASE (AST) (SGOT): CPT | Performed by: EMERGENCY MEDICINE

## 2018-11-02 PROCEDURE — 0 IOPAMIDOL PER 1 ML: Performed by: EMERGENCY MEDICINE

## 2018-11-02 PROCEDURE — P9612 CATHETERIZE FOR URINE SPEC: HCPCS

## 2018-11-02 PROCEDURE — 85610 PROTHROMBIN TIME: CPT

## 2018-11-02 PROCEDURE — 0042T HC CT CEREBRAL PERFUSION W/WO CONTRAST: CPT

## 2018-11-02 PROCEDURE — 81003 URINALYSIS AUTO W/O SCOPE: CPT | Performed by: EMERGENCY MEDICINE

## 2018-11-02 PROCEDURE — 85014 HEMATOCRIT: CPT

## 2018-11-02 RX ORDER — ASPIRIN 300 MG/1
300 SUPPOSITORY RECTAL ONCE
Status: COMPLETED | OUTPATIENT
Start: 2018-11-02 | End: 2018-11-02

## 2018-11-02 RX ORDER — SODIUM CHLORIDE 0.9 % (FLUSH) 0.9 %
10 SYRINGE (ML) INJECTION AS NEEDED
Status: DISCONTINUED | OUTPATIENT
Start: 2018-11-02 | End: 2018-11-03 | Stop reason: HOSPADM

## 2018-11-02 RX ORDER — HYDROMORPHONE HYDROCHLORIDE 1 MG/ML
0.5 INJECTION, SOLUTION INTRAMUSCULAR; INTRAVENOUS; SUBCUTANEOUS ONCE
Status: COMPLETED | OUTPATIENT
Start: 2018-11-02 | End: 2018-11-02

## 2018-11-02 RX ADMIN — ASPIRIN 300 MG: 300 SUPPOSITORY RECTAL at 22:38

## 2018-11-02 RX ADMIN — HYDROMORPHONE HYDROCHLORIDE 0.5 MG: 1 INJECTION, SOLUTION INTRAMUSCULAR; INTRAVENOUS; SUBCUTANEOUS at 23:34

## 2018-11-02 RX ADMIN — IOPAMIDOL 40 ML: 755 INJECTION, SOLUTION INTRAVENOUS at 22:10

## 2018-11-03 ENCOUNTER — HOSPITAL ENCOUNTER (INPATIENT)
Facility: HOSPITAL | Age: 83
LOS: 2 days | End: 2018-11-05
Attending: INTERNAL MEDICINE | Admitting: INTERNAL MEDICINE

## 2018-11-03 VITALS
SYSTOLIC BLOOD PRESSURE: 115 MMHG | BODY MASS INDEX: 27.1 KG/M2 | WEIGHT: 182.98 LBS | TEMPERATURE: 98 F | RESPIRATION RATE: 18 BRPM | OXYGEN SATURATION: 98 % | HEART RATE: 98 BPM | HEIGHT: 69 IN | DIASTOLIC BLOOD PRESSURE: 56 MMHG

## 2018-11-03 VITALS
WEIGHT: 182.98 LBS | RESPIRATION RATE: 18 BRPM | HEART RATE: 95 BPM | BODY MASS INDEX: 27.1 KG/M2 | OXYGEN SATURATION: 97 % | TEMPERATURE: 98.1 F | DIASTOLIC BLOOD PRESSURE: 56 MMHG | SYSTOLIC BLOOD PRESSURE: 115 MMHG | HEIGHT: 69 IN

## 2018-11-03 PROBLEM — I63.9 CVA (CEREBRAL VASCULAR ACCIDENT) (HCC): Status: ACTIVE | Noted: 2018-11-03

## 2018-11-03 PROCEDURE — 25010000002 LORAZEPAM PER 2 MG: Performed by: NURSE PRACTITIONER

## 2018-11-03 PROCEDURE — 25010000002 HYDROMORPHONE PER 4 MG: Performed by: NURSE PRACTITIONER

## 2018-11-03 PROCEDURE — 99223 1ST HOSP IP/OBS HIGH 75: CPT | Performed by: INTERNAL MEDICINE

## 2018-11-03 RX ORDER — GLYCOPYRROLATE 0.2 MG/ML
0.4 INJECTION INTRAMUSCULAR; INTRAVENOUS EVERY 6 HOURS PRN
Status: DISCONTINUED | OUTPATIENT
Start: 2018-11-03 | End: 2018-11-03

## 2018-11-03 RX ORDER — SCOLOPAMINE TRANSDERMAL SYSTEM 1 MG/1
1 PATCH, EXTENDED RELEASE TRANSDERMAL
Status: DISCONTINUED | OUTPATIENT
Start: 2018-11-06 | End: 2018-11-05 | Stop reason: HOSPADM

## 2018-11-03 RX ORDER — LORAZEPAM 2 MG/ML
0.25 INJECTION INTRAMUSCULAR EVERY 4 HOURS PRN
Status: DISCONTINUED | OUTPATIENT
Start: 2018-11-03 | End: 2018-11-05 | Stop reason: HOSPADM

## 2018-11-03 RX ORDER — LORAZEPAM 2 MG/ML
2 INJECTION INTRAMUSCULAR
Status: DISCONTINUED | OUTPATIENT
Start: 2018-11-03 | End: 2018-11-05 | Stop reason: HOSPADM

## 2018-11-03 RX ORDER — PROMETHAZINE HYDROCHLORIDE 25 MG/ML
12.5 INJECTION, SOLUTION INTRAMUSCULAR; INTRAVENOUS EVERY 4 HOURS PRN
Status: DISCONTINUED | OUTPATIENT
Start: 2018-11-03 | End: 2018-11-03 | Stop reason: HOSPADM

## 2018-11-03 RX ORDER — ACETAMINOPHEN 650 MG/1
650 SUPPOSITORY RECTAL EVERY 4 HOURS PRN
Status: CANCELLED | OUTPATIENT
Start: 2018-11-03

## 2018-11-03 RX ORDER — FENTANYL 25 UG/H
1 PATCH TRANSDERMAL
Status: DISCONTINUED | OUTPATIENT
Start: 2018-11-05 | End: 2018-11-03 | Stop reason: HOSPADM

## 2018-11-03 RX ORDER — HYDROMORPHONE HYDROCHLORIDE 1 MG/ML
0.5 INJECTION, SOLUTION INTRAMUSCULAR; INTRAVENOUS; SUBCUTANEOUS
Status: DISCONTINUED | OUTPATIENT
Start: 2018-11-03 | End: 2018-11-03

## 2018-11-03 RX ORDER — SODIUM CHLORIDE 0.9 % (FLUSH) 0.9 %
3 SYRINGE (ML) INJECTION EVERY 12 HOURS SCHEDULED
Status: DISCONTINUED | OUTPATIENT
Start: 2018-11-03 | End: 2018-11-03 | Stop reason: HOSPADM

## 2018-11-03 RX ORDER — HYDROMORPHONE HYDROCHLORIDE 1 MG/ML
0.5 INJECTION, SOLUTION INTRAMUSCULAR; INTRAVENOUS; SUBCUTANEOUS
Status: DISCONTINUED | OUTPATIENT
Start: 2018-11-03 | End: 2018-11-04

## 2018-11-03 RX ORDER — LORAZEPAM 2 MG/ML
0.5 INJECTION INTRAMUSCULAR
Status: DISCONTINUED | OUTPATIENT
Start: 2018-11-03 | End: 2018-11-03

## 2018-11-03 RX ORDER — PROMETHAZINE HYDROCHLORIDE 25 MG/ML
12.5 INJECTION, SOLUTION INTRAMUSCULAR; INTRAVENOUS EVERY 6 HOURS PRN
Status: DISCONTINUED | OUTPATIENT
Start: 2018-11-03 | End: 2018-11-03

## 2018-11-03 RX ORDER — LORAZEPAM 2 MG/ML
0.25 INJECTION INTRAMUSCULAR EVERY 6 HOURS
Status: DISCONTINUED | OUTPATIENT
Start: 2018-11-03 | End: 2018-11-03 | Stop reason: HOSPADM

## 2018-11-03 RX ORDER — GLYCOPYRROLATE 0.2 MG/ML
0.1 INJECTION INTRAMUSCULAR; INTRAVENOUS ONCE
Status: COMPLETED | OUTPATIENT
Start: 2018-11-03 | End: 2018-11-03

## 2018-11-03 RX ORDER — SODIUM CHLORIDE 0.9 % (FLUSH) 0.9 %
3-10 SYRINGE (ML) INJECTION AS NEEDED
Status: DISCONTINUED | OUTPATIENT
Start: 2018-11-03 | End: 2018-11-03 | Stop reason: HOSPADM

## 2018-11-03 RX ORDER — DOCUSATE SODIUM 50 MG/5 ML
150 LIQUID (ML) ORAL 2 TIMES DAILY PRN
Status: DISCONTINUED | OUTPATIENT
Start: 2018-11-03 | End: 2018-11-03 | Stop reason: HOSPADM

## 2018-11-03 RX ORDER — SCOLOPAMINE TRANSDERMAL SYSTEM 1 MG/1
1 PATCH, EXTENDED RELEASE TRANSDERMAL
Status: DISCONTINUED | OUTPATIENT
Start: 2018-11-03 | End: 2018-11-03 | Stop reason: HOSPADM

## 2018-11-03 RX ORDER — ACETAMINOPHEN 650 MG/1
650 SUPPOSITORY RECTAL EVERY 4 HOURS PRN
Status: DISCONTINUED | OUTPATIENT
Start: 2018-11-03 | End: 2018-11-03 | Stop reason: HOSPADM

## 2018-11-03 RX ORDER — HYDROMORPHONE HYDROCHLORIDE 1 MG/ML
0.25 INJECTION, SOLUTION INTRAMUSCULAR; INTRAVENOUS; SUBCUTANEOUS
Status: DISCONTINUED | OUTPATIENT
Start: 2018-11-03 | End: 2018-11-03 | Stop reason: HOSPADM

## 2018-11-03 RX ORDER — LORAZEPAM 2 MG/ML
0.25 INJECTION INTRAMUSCULAR EVERY 6 HOURS
Status: DISCONTINUED | OUTPATIENT
Start: 2018-11-03 | End: 2018-11-04

## 2018-11-03 RX ORDER — ACETAMINOPHEN 650 MG/1
650 SUPPOSITORY RECTAL EVERY 4 HOURS PRN
Status: DISCONTINUED | OUTPATIENT
Start: 2018-11-03 | End: 2018-11-05 | Stop reason: HOSPADM

## 2018-11-03 RX ORDER — ONDANSETRON 2 MG/ML
4 INJECTION INTRAMUSCULAR; INTRAVENOUS EVERY 6 HOURS PRN
Status: DISCONTINUED | OUTPATIENT
Start: 2018-11-03 | End: 2018-11-05 | Stop reason: HOSPADM

## 2018-11-03 RX ORDER — ONDANSETRON 2 MG/ML
4 INJECTION INTRAMUSCULAR; INTRAVENOUS EVERY 6 HOURS PRN
Status: DISCONTINUED | OUTPATIENT
Start: 2018-11-03 | End: 2018-11-03 | Stop reason: HOSPADM

## 2018-11-03 RX ORDER — LORAZEPAM 2 MG/ML
0.5 INJECTION INTRAMUSCULAR EVERY 4 HOURS PRN
Status: DISCONTINUED | OUTPATIENT
Start: 2018-11-03 | End: 2018-11-05 | Stop reason: HOSPADM

## 2018-11-03 RX ORDER — FENTANYL 25 UG/H
1 PATCH TRANSDERMAL
Status: DISCONTINUED | OUTPATIENT
Start: 2018-11-05 | End: 2018-11-05 | Stop reason: HOSPADM

## 2018-11-03 RX ORDER — HYDROMORPHONE HYDROCHLORIDE 1 MG/ML
0.5 INJECTION, SOLUTION INTRAMUSCULAR; INTRAVENOUS; SUBCUTANEOUS
Status: DISCONTINUED | OUTPATIENT
Start: 2018-11-03 | End: 2018-11-03 | Stop reason: HOSPADM

## 2018-11-03 RX ORDER — GLYCOPYRROLATE 0.2 MG/ML
0.4 INJECTION INTRAMUSCULAR; INTRAVENOUS EVERY 6 HOURS PRN
Status: DISCONTINUED | OUTPATIENT
Start: 2018-11-03 | End: 2018-11-05 | Stop reason: HOSPADM

## 2018-11-03 RX ORDER — ACETAMINOPHEN 325 MG/1
650 TABLET ORAL EVERY 4 HOURS PRN
Status: DISCONTINUED | OUTPATIENT
Start: 2018-11-03 | End: 2018-11-03 | Stop reason: HOSPADM

## 2018-11-03 RX ORDER — HYDROMORPHONE HYDROCHLORIDE 1 MG/ML
0.25 INJECTION, SOLUTION INTRAMUSCULAR; INTRAVENOUS; SUBCUTANEOUS
Status: DISCONTINUED | OUTPATIENT
Start: 2018-11-03 | End: 2018-11-04

## 2018-11-03 RX ORDER — BISACODYL 10 MG
10 SUPPOSITORY, RECTAL RECTAL DAILY PRN
Status: DISCONTINUED | OUTPATIENT
Start: 2018-11-03 | End: 2018-11-03 | Stop reason: HOSPADM

## 2018-11-03 RX ORDER — LORAZEPAM 2 MG/ML
0.5 INJECTION INTRAMUSCULAR
Status: DISCONTINUED | OUTPATIENT
Start: 2018-11-03 | End: 2018-11-03 | Stop reason: HOSPADM

## 2018-11-03 RX ORDER — LORAZEPAM 2 MG/ML
0.5 INJECTION INTRAMUSCULAR
Status: DISCONTINUED | OUTPATIENT
Start: 2018-11-03 | End: 2018-11-03 | Stop reason: SDUPTHER

## 2018-11-03 RX ADMIN — HYDROMORPHONE HYDROCHLORIDE 0.5 MG: 1 INJECTION, SOLUTION INTRAMUSCULAR; INTRAVENOUS; SUBCUTANEOUS at 19:55

## 2018-11-03 RX ADMIN — LORAZEPAM 0.5 MG: 2 INJECTION INTRAMUSCULAR; INTRAVENOUS at 21:53

## 2018-11-03 RX ADMIN — LORAZEPAM 0.25 MG: 2 INJECTION INTRAMUSCULAR; INTRAVENOUS at 12:31

## 2018-11-03 RX ADMIN — LORAZEPAM 0.25 MG: 2 INJECTION INTRAMUSCULAR; INTRAVENOUS at 06:27

## 2018-11-03 RX ADMIN — HYDROGEN PEROXIDE: 3 LIQUID TOPICAL at 19:54

## 2018-11-03 RX ADMIN — HYDROMORPHONE HYDROCHLORIDE 0.5 MG: 1 INJECTION, SOLUTION INTRAMUSCULAR; INTRAVENOUS; SUBCUTANEOUS at 17:37

## 2018-11-03 RX ADMIN — HYDROMORPHONE HYDROCHLORIDE 0.25 MG: 1 INJECTION, SOLUTION INTRAMUSCULAR; INTRAVENOUS; SUBCUTANEOUS at 12:34

## 2018-11-03 RX ADMIN — HYDROMORPHONE HYDROCHLORIDE 0.5 MG: 1 INJECTION, SOLUTION INTRAMUSCULAR; INTRAVENOUS; SUBCUTANEOUS at 23:01

## 2018-11-03 RX ADMIN — GLYCOPYRROLATE 0.4 MG: 0.2 INJECTION, SOLUTION INTRAMUSCULAR; INTRAVENOUS at 15:58

## 2018-11-03 RX ADMIN — LORAZEPAM 0.25 MG: 2 INJECTION INTRAMUSCULAR; INTRAVENOUS at 17:34

## 2018-11-03 RX ADMIN — HYDROMORPHONE HYDROCHLORIDE 0.25 MG: 1 INJECTION, SOLUTION INTRAMUSCULAR; INTRAVENOUS; SUBCUTANEOUS at 23:51

## 2018-11-03 RX ADMIN — LORAZEPAM 0.25 MG: 2 INJECTION INTRAMUSCULAR; INTRAVENOUS at 23:51

## 2018-11-03 RX ADMIN — GLYCOPYRROLATE 0.1 MG: 0.2 INJECTION, SOLUTION INTRAMUSCULAR; INTRAVENOUS at 03:22

## 2018-11-03 RX ADMIN — LORAZEPAM 0.5 MG: 2 INJECTION INTRAMUSCULAR; INTRAVENOUS at 19:55

## 2018-11-03 RX ADMIN — SCOPOLAMINE 1 PATCH: 1 PATCH, EXTENDED RELEASE TRANSDERMAL at 03:25

## 2018-11-03 RX ADMIN — HYDROMORPHONE HYDROCHLORIDE 0.25 MG: 1 INJECTION, SOLUTION INTRAMUSCULAR; INTRAVENOUS; SUBCUTANEOUS at 15:15

## 2018-11-03 RX ADMIN — LIDOCAINE HYDROCHLORIDE 5 ML: 20 SOLUTION ORAL; TOPICAL at 19:54

## 2018-11-03 RX ADMIN — HYDROMORPHONE HYDROCHLORIDE 0.25 MG: 1 INJECTION, SOLUTION INTRAMUSCULAR; INTRAVENOUS; SUBCUTANEOUS at 03:22

## 2018-11-03 RX ADMIN — GLYCOPYRROLATE 0.4 MG: 0.2 INJECTION, SOLUTION INTRAMUSCULAR; INTRAVENOUS at 20:30

## 2018-11-03 NOTE — DISCHARGE SUMMARY
[facility] Medicine Services  DISCHARGE SUMMARY    Patient Name: Luba Landaverde  : 1935  MRN: [id number]    Date of Admission: 2018  Date of Discharge:  11/3/2018  Primary Care Physician: Morales Chris DO    Consults     No orders found for last 30 day(s).        Hospital Course     Presenting Problem:   Acute ischemic right MCA stroke (CMS/HCC) [I63.511]    Active Hospital Problems    Diagnosis Date Noted   • Acute ischemic right MCA stroke (CMS/HCC) [I63.511] 2018      Resolved Hospital Problems    Diagnosis Date Noted Date Resolved   No resolved problems to display.          Hospital Course:  Luba Landaverde is a 83 y.o. female with history of HTN who presented with decreased activity. CT head in the ER was negative for acute process; however, CT perfusion showed decreased perfusion to a significant portion of her right MCA. She was not a candidate for TPA or intervention. Due to poor functional status at baseline, family declined further work-up and wanted to focus on comfort. Hospice was consulted and she was transferred to inpatient hospice.       Day of Discharge     HPI:   CVA    Review of Systems  Unable to obtain    Vital Signs:   Temp:  [98.1 °F (36.7 °C)] 98.1 °F (36.7 °C)  Heart Rate:  [95] 95  Resp:  [18] 18  BP: (115-131)/(56-78) 115/56     Physical Exam:  General: No acute distress. Laying quietly in bed  CV: RRR, no murmurs, no LE edema  Lungs: CTAB, no wheezing or crackles. Normal respiratory effort  Abd: Soft, non-tender, non-distedned, bowel sounds normoactive   Neuro: unable to assess  Psych: patient does not awaken to verbal or physical stimulus.   Skin: no lesions or rashes noted on exposed arms and legs      Pertinent  and/or Most Recent Results       Results from last 7 days  Lab Units 184 187   WBC 10*3/mm3 11.17*  --    HEMOGLOBIN g/dL 15.5  --    HEMOGLOBIN, POC g/dL  --  16.0   HEMATOCRIT % 47.8*  --     HEMATOCRIT POC %  --  47   PLATELETS 10*3/mm3 440  --    CREATININE mg/dL  --  0.50*       Results from last 7 days  Lab Units 11/02/18 2224 11/02/18 2207   ALT (SGPT) U/L 31  --    AST (SGOT) U/L 25  --    PROTIME seconds  --  12.5*   INR   --  1.0   APTT seconds 28.2  --            Invalid input(s): TG, LDLCALC, LDLREALC      Brief Urine Lab Results  (Last result in the past 365 days)      Color   Clarity   Blood   Leuk Est   Nitrite   Protein   CREAT   Urine HCG        11/02/18 2238 Yellow Clear Negative Negative Negative Negative               Microbiology Results Abnormal     None          Imaging Results (all)     Procedure Component Value Units Date/Time    XR Chest 1 View [[id number]] Collected:  11/02/18 2223     Updated:  11/02/18 2312    Narrative:       EXAM:    XR Chest, 1 View     EXAM DATE/TIME:    11/2/2018 10:23 PM     CLINICAL HISTORY:    83 years old, female; Cerebral infarction due to unspecified occlusion or   stenosis of right middle cerebral artery; Signs and symptoms; Other: Acute   stroke protocol (onset < 12 hrs); Additional info: Acute stroke protocol (onset   < 12 hrs)     TECHNIQUE:    XR of the chest, 1 view.     COMPARISON:    No relevant prior studies available.     FINDINGS:    Lungs:  Degree of lung inflation is normal. No evidence of pulmonary edema. No   focal consolidation or parenchymal lung mass.    Pleural space:  No pleural effusion or pneumothorax.    Heart/Mediastinum:  Cardiac silhouette appears normal.    Lymph nodes:  No adenopathy or hilar mass.    Bones/joints:  Bony structures are unremarkable.       Impression:       No acute or focal cardiopulmonary process.     THIS DOCUMENT HAS BEEN ELECTRONICALLY SIGNED BY LUIS FERNANDO EDUARDO MD    CT Cerebral Perfusion With & Without Contrast [[id number]] Collected:  11/02/18 2201     Updated:  11/02/18 2225    Addenda:        Findings were discussed by telephone with CASS Dobbs  on   11/2/2018   10:25 PM EDT, to  expedite further management.       THIS DOCUMENT HAS BEEN ELECTRONICALLY SIGNED BY JORGE ALLEN MD  Signed:  11/02/18 2225 by Jorge Allen MD    Narrative:       EXAM:  CT Brain Perfusion With Intravenous Contrast    EXAM DATE/TIME:  11/2/2018 10:01 PM    CLINICAL HISTORY:  83 years old, female; Signs and symptoms; Altered mental   status/memory loss; Additional info: Neuro deficit(s), subacute    TECHNIQUE:  Axial computed tomography images of the brain with intravenous   contrast using cerebral perfusion protocol. Post-processing parametric maps   were created and reviewed. These include cerebral blood flow, cerebral blood   volume and mean transit time.  All CT scans at this facility use at least one of these dose optimization   techniques: automated exposure control; mA and/or kV adjustment per patient   size (includes targeted exams where dose is matched to clinical indication); or   iterative reconstruction.  MIP reconstructed images were created and reviewed.    COMPARISON:  CT HEAD WO CONTRAST STROKE PROTOCOL 11/2/2018 9:54 PM    FINDINGS:    Large area of abnormal perfusion parameters involving the right MCA   distribution. There is globally decreased cerebral blood flow in the entire   right MCA distribution, with marked elevation of mean transit time.    Cerebral blood volume perfusion map demonstrates preservation of cerebral blood   volume within the right MCA distribution suggesting intact autoregulatory   mechanism.    Remote contralateral posterior division left MCA infarct changes are noted   incidentally      Impression:         Entire right MCA distribution demonstrates ischemic changes but essentially no   loss of compensatory autoregulation, indicating potentially salvageable brain   tissue without evidence of a significant core infarct at this time.    THIS DOCUMENT HAS BEEN ELECTRONICALLY SIGNED BY JORGE ALLEN MD    CT Head Without Contrast Stroke Protocol [[id number]]  Collected:  11/02/18 2156     Updated:  11/02/18 2205    Addenda:        Findings were discussed by telephone with CASS Dobbs  on   11/2/2018   10:04 PM EDT, to expedite further management.       THIS DOCUMENT HAS BEEN ELECTRONICALLY SIGNED BY JORGE ALLEN MD  Signed:  11/02/18 2205 by Jorge Allen MD    Narrative:       EXAM:  CT Head Without Intravenous Contrast    EXAM DATE/TIME:  11/2/2018 9:56 PM    CLINICAL HISTORY:  83 years old, female; Signs and symptoms; Altered mental   status/memory loss; Additional info: Code stroke, last known normal at 1900   tonight, AMS, history of old stroke, eyes deviating to right, please document   scan time in report: 2155    TECHNIQUE:  Axial computed tomography images of the head/brain without   intravenous contrast.  All CT scans at this facility use at least one of these dose optimization   techniques: automated exposure control; mA and/or kV adjustment per patient   size (includes targeted exams where dose is matched to clinical indication); or   iterative reconstruction.    COMPARISON:  No relevant prior studies available.    FINDINGS:     No focal extracranial soft tissue swelling.     Prominent ventricles and CSF spaces suggest parenchymal volume loss.     No intracranial mass or midline shift.   No acute intracranial hemorrhage.   Mild decreased attenuation in periventricular/centrum semiovale white matter.     No focal effacement of cortical sulci to indicate acute cortical infarct.   Old infarct, posterior division left MCA distribution    No calvarial fracture or destructive process.   Partial opacification sphenoid and left maxillary sinuses.   Mastoid air cells are unremarkable.   Optic globes and orbits show no concerning deformity.       Impression:       No acute intracranial abnormality.    Atrophy, remote posterior division left MCA infarct and chronic   microangiopathic change in supratentorial white matter.     THIS DOCUMENT HAS BEEN  ELECTRONICALLY SIGNED BY LUIS FERNANDO EDUARDO MD                           Discharge Details        Discharge Medications      Continue These Medications      Instructions Start Date   fentaNYL 25 MCG/HR patch  Commonly known as:  DURAGESIC   1 patch, Transdermal, Every 72 Hours               Discharge Disposition:  Hospice/Medical Facility (DC - External)    Discharge Diet: NPO      Discharge Activity: as tolerated      Code Status/Level of Support:  Code Status and Medical Interventions:   Ordered at: 11/03/18 0343     Level Of Support Discussed With:    Health Care Surrogate     Code Status:    No CPR     Medical Interventions (Level of Support Prior to Arrest):    Comfort Measures     Comments:    DNR/DNI, purely comfort measures only. Do not prolong suffering or poor QOL. Benefit>risk for any comfort meds and use to symptom relief. Appropriate teachback with GUARDIAN daughter Celia (son Matthew is also a guardian but not present on admit)       No future appointments.        Time Spent on Discharge:  35 minutes    Electronically signed by Alycia Cano MD, 11/03/18, 1:53 PM.

## 2018-11-03 NOTE — PROGRESS NOTES
I have assumed care from my colleague, Dr. Cruz. Patient is a 82yo F with dementia who presented with acute R MCA stroke. Per family member at bedside, she has not been interactive or able to communicate since admission. They are interested in pursuing comfortable/hospice as patient with poor functional status prior to her most recent stroke. Hospice consulted and awaiting recommendations.     Alycia Cano MD

## 2018-11-03 NOTE — PLAN OF CARE
Problem: Dying Patient, Actively (Adult)  Goal: Identify Related Risk Factors and Signs and Symptoms  Outcome: Ongoing (interventions implemented as appropriate)    Goal: Comfort/Pain Control  Outcome: Ongoing (interventions implemented as appropriate)    Goal: Peace/Preservation of Dignity During the Dying Process  Outcome: Ongoing (interventions implemented as appropriate)      Problem: Patient Care Overview  Goal: Plan of Care Review  Outcome: Ongoing (interventions implemented as appropriate)   11/03/18 1435   Coping/Psychosocial   Plan of Care Reviewed With spouse;family   Plan of Care Review   Progress declining   OTHER   Outcome Summary Family at bedside. Patient admitted to inpatient hospice. F/c in place. PRN dilaudid x1. Family denies further needs. Resting comfortably.      Goal: Individualization and Mutuality  Outcome: Ongoing (interventions implemented as appropriate)    Goal: Discharge Needs Assessment  Outcome: Ongoing (interventions implemented as appropriate)    Goal: Interprofessional Rounds/Family Conf  Outcome: Ongoing (interventions implemented as appropriate)

## 2018-11-03 NOTE — H&P
Hospice H&P     Attending Provider: Estela Lopez MD    Code Status:   Code Status and Medical Interventions:   Ordered at: 11/03/18 9982     Level Of Support Discussed With:    Health Care Surrogate    Next of Kin (If No Surrogate)     Code Status:    No CPR     Medical Interventions (Level of Support Prior to Arrest):    Comfort Measures     Comments:    hospice      Advanced Directives: Advance Directive Status: Patient has advance directive, copy in chart     Subjective   HPI:   Luba Landaverde is a 83 y.o. female with PMH significant for HTN, Breast CA (2000 s/p tamoxifen and mastectomy), and prior CVA in 2015 with left hemiparesis and dysphagia requiring G-tube. PT presented to [facility] ED from NH r/t AMS and new right paralysis. Work-up revealed ;arge ischemic infarct to right MCA and was unable to receive TPA r/t previous hemorraghic conversion.  Given pt previously debilitated state and new CVA, pt family elected to pursue a complete comfort focused POC. Pt was admitted to inpatient hospice on 11/3/18 for symptom management of anxiety/restlessness, pain, and resp congestion.      ROS: Unable to obtain r/t AMS/condition.       Past Medical History:   Diagnosis Date   • Cancer (CMS/HCC)     breast cancer, ~2000, surgery only and tamoxifen, otherwise no chemo/radiation   • Dysphagia     nothing by mouth since stroke in 2015; via feeding tube    • Dysphagia     since CVA 2015, strict NPO and PEG since. issues with aspiration if ever flat and not raised up. stomach issues with TF on higher rates, last hospitalization earlier in 2018 and cutting TF rate helped.   • H/O: hysterectomy    • Hypertension    • Stroke (CMS/HCC) 2015    L sided weakness      Past Surgical History:   Procedure Laterality Date   • CHOLECYSTECTOMY     • GASTROSTOMY FEEDING TUBE INSERTION     • MASTECTOMY       Social History     Social History   • Marital status:      Spouse name: N/A   • Number of children: N/A   • Years of  education: N/A     Occupational History   • Not on file.     Social History Main Topics   • Smoking status: Former Smoker   • Smokeless tobacco: Never Used      Comment: thinks maybe smoked once about 40 years ago for anxiety when got , just snuck some, not very long issue   • Alcohol use Not on file      Comment: no hx abuse   • Drug use: Unknown      Comment: no hx abuse   • Sexual activity: Defer     Other Topics Concern   • Not on file     Social History Narrative    Ms. Landaverde is an 83 year old white  female. She has lived in a nursing home since disabling CVA in 2015. Lived with grandson and his wife briefly before that and used to be independent until that life changing event. She has four children. She has grandchildren.     No family history on file.    Allergies   Allergen Reactions   • Gadolinium Derivatives Anaphylaxis   • Codeine Unknown (See Comments)     Unknown, family thinks possibly trouble breathing   • Morphine Unknown (See Comments)     unknown   • Penicillins Hives   • Bactrim [Sulfamethoxazole-Trimethoprim] Rash   • Latex Rash   • Sulfa Antibiotics Rash       Current Facility-Administered Medications   Medication Dose Route Frequency Provider Last Rate Last Dose   • acetaminophen (TYLENOL) suppository 650 mg  650 mg Rectal Q4H PRN Fadumo Ashraf APRN       • [START ON 11/5/2018] fentaNYL (DURAGESIC) 25 MCG/HR patch 1 patch  1 patch Transdermal Q72H [place]       • glycopyrrolate (ROBINUL) injection 0.4 mg  0.4 mg Intravenous Q6H PRN [place] APRN       • HYDROmorphone (DILAUDID) injection 0.25 mg  0.25 mg Intravenous Q2H PRN Fadumo Ashraf APRN        Or   • HYDROmorphone (DILAUDID) injection 0.5 mg  0.5 mg Intravenous Q2H PRN Fadumo Ashraf APRN       • LORazepam (ATIVAN) injection 0.25 mg  0.25 mg Intravenous Q6H [place] APRN       • LORazepam (ATIVAN) injection 0.25 mg  0.25 mg Intravenous Q4H PRN  "Fadumo Ashraf APRN        Or   • LORazepam (ATIVAN) injection 0.5 mg  0.5 mg Intravenous Q4H PRN Fadumo Ashrfa APRN       • LORazepam (ATIVAN) injection 2 mg  2 mg Intravenous Q15 Min PRN Fadumo Ashraf APRN       • ondansetron (ZOFRAN) injection 4 mg  4 mg Intravenous Q6H PRN Fadumo Ashraf APRN       • [START ON 11/6/2018] Scopolamine (TRANSDERM-SCOP) 1.5 MG/3DAYS patch 1 patch  1 patch Transdermal Q72H Fadumo Ashraf APRN            •  acetaminophen  •  glycopyrrolate  •  HYDROmorphone **OR** HYDROmorphone  •  LORazepam **OR** LORazepam  •  LORazepam  •  ondansetron    Current medication reviewed for route, type, dose and frequency and are current per MAR at time of dictation.    Objective     /56   Pulse 98   Temp 98 °F (36.7 °C) (Oral)   Resp 18   Ht 175.3 cm (69.02\")   Wt 83 kg (182 lb 15.7 oz)   SpO2 98%   BMI 27.01 kg/m²   No intake or output data in the 24 hours ending 11/03/18 1531    PPS: 10%  Physical Examination:   General Appearance:    Unresponsive to light verbal/tactile stimuli, NAD, face relaxed   HEENT:    NC/AT, eyes closed, dry MM   Neck:   supple, trachea midline, no JVD   Lungs:     Few scattered rhonchi upper lobes bilat, respirations irregular, even and unlabored, intermittent 10-15 sec periods of apnea    Heart:    RRR, S1 and S2, no M/R/G   Abdomen:     Normal bowel sounds, soft, non-distended, PEG noted   Extremities:   No movement bilateral upper/lower extremities, no edema   Pulses:   Pulses palpable and equal bilaterally   Skin:   BUE hands cool, BLE warm    Neurologic:   Unresponsive to exam   Psych:   Calm         Labs:     Results from last 7 days  Lab Units 11/02/18  2224   WBC 10*3/mm3 11.17*   HEMOGLOBIN g/dL 15.5   HEMATOCRIT % 47.8*   PLATELETS 10*3/mm3 440       Results from last 7 days  Lab Units 11/02/18  2207   CREATININE mg/dL 0.50*       Results from last 7 days  Lab Units 11/02/18 2224 11/02/18  2207 "   CREATININE mg/dL  --  0.50*   ALT (SGPT) U/L 31  --    AST (SGOT) U/L 25  --      Imaging Results (last 72 hours)     ** No results found for the last 72 hours. **          Diagnostics/Clinical Data: Reviewed    Assessment/Plan    Patient Active Problem List   Diagnosis   • Hemiparesis affecting right side as late effect of cerebrovascular accident (CVA) (CMS/HCC)   • Oropharyngeal dysphagia   • Essential hypertension   • Aphasia as late effect of cerebrovascular accident (CVA)   • Impaired mobility and ADLs   • Physical debility   • Gastrostomy status (CMS/HCC)   • Acute ischemic right MCA stroke (CMS/HCC)   • CVA (cerebral vascular accident) (CMS/HCC)       Assessment/Problems:   1.Anxiety- sched ativan q6h as on sched benzo in NH  2. Agitation/Restlessness- prn ativan  3. Pain- continue fentanyl (home med), prn dilaudid for pain or dyspnea  4. Risk for Seizure- prn ativan seizure dosing  5. Resp congestion- continue scop patch and prn robinul     Goals of Care: Comfort Only  Family/Support/Spiritual: Pt with 4 children; granddaughter at bedside at time of visit    Plan:  Medications as above, discussed pt current condition, prognosis, s/s of death and dying with granddaughter at bedside.  Confirms [facility] comfort only.     Justification: Patient meets criteria for Acute In-patient Care due to requires frequent nursing care and assessment, IV/subcut medications for symptom management.    Time:55 min    Fadumo Ashraf, APRN  11/3/2018      EMR Dragon/Transcription disclaimer:  Much of this encounter note is an electronic transcription/translation of spoken language to printed text. Electronic translation of spoken language may permit erroneous, or at times, nonsensical words or phrases to be inadvertently transcribed. Although I have reviewed the note for such errors, some may still exist.

## 2018-11-03 NOTE — PLAN OF CARE
Problem: Patient Care Overview  Goal: Plan of Care Review  Outcome: Ongoing (interventions implemented as appropriate)   11/03/18 7582   Coping/Psychosocial   Plan of Care Reviewed With daughter;family   Plan of Care Review   Progress no change   OTHER   Outcome Summary Patient arrived from ED. Awaiting code status. Awaiting orders. MD notified multiple times for orders and code status and also per family request to come and discuss patient's prognosis- MD to bedside and later nurse pract to bedside. Family aware of dying process and verbalized comfort measures for patient. Grieving appropriately. Pleasant with staff. Orders received at 0345. Martines anchored. Patient remains calm and comfortable. G tube clamped. Family remains at bedside. Palliative and hospice consults await. Conftinue to keep patient comfortable at DNR status.

## 2018-11-03 NOTE — ED PROVIDER NOTES
Subjective   Ms. Luba Landaverde is a 83 y.o. female who presents to the ED by EMS with stroke sx. Last known well between 1830 and 1900 and ED arrival time 2150. 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 Nursing home reports at 2045 pt had sudden onset of altered mental status consisting of persistent moaning, which prompted call to EMS. EMS reports en route pt was non-communicative but would respond with increased moaning to painful stimuli. Pt would deviate her eyes to the right and vitals were normal with /67 and HR 90. Nursing home notes at baseline pt is able to point and gesture when asked questions. She is only anti-coagulated with aspirin. Nursing home staff also reports pt received a new fentanyl patch today, however the dosage was the same. They deny any sx of rhinorrhea, congestion, cough, fever, CP, and any other acute sx at this time. Hx limited secondary to pt's mental status.    Family arrives to ED and reports a hx of cerebral hemorrhage with last CVA.    Pt not candidate for TPA secondary to previous head bleed.               History provided by:  Patient  History limited by:  Mental status change and patient nonverbal  Neurologic Problem   The patient's primary symptoms include an altered mental status. This is a new problem. The current episode started today. The neurological problem developed suddenly. Last Known Well Instant: between 1830 and 1900.  The problem is unchanged. Affected Side: baseline RUE and RLE weakness secondary to last CVA in 2015. Her past medical history is significant for a CVA.       Review of Systems   Unable to perform ROS: Mental status change   Neurological: Positive for speech difficulty (worse than baseline ).       No past medical history on file.    Allergies   Allergen Reactions   • Codeine Unknown (See Comments)     unknown   • Morphine Unknown (See Comments)     unknown   • Penicillins Hives   • Sulfa  Antibiotics Rash       No past surgical history on file.    No family history on file.    Social History     Social History   • Marital status:      Social History Main Topics   • Drug use: Unknown     Other Topics Concern   • Not on file         Objective   Physical Exam   Constitutional: She appears well-nourished. No distress.   Baseline aphasia and decreased mentation secondary to CVA in 2015   HENT:   Head: Normocephalic and atraumatic.   Secretions in posterior oropharynx.   Eyes: Conjunctivae are normal. Right pupil is reactive. Left pupil is reactive. Pupils are equal.   Forced myrna to the right. Pupils equal at 3mm. Equally reactive bilaterally.    Neck: Normal range of motion. Neck supple.   Cardiovascular: Normal rate, regular rhythm and normal heart sounds.  Exam reveals no gallop and no friction rub.    No murmur heard.  Pulmonary/Chest: Effort normal and breath sounds normal. She has no wheezes.    Crackly sounds in lung bases bilaterally.    Abdominal: Soft. Bowel sounds are normal. There is no tenderness.   Musculoskeletal: She exhibits no edema (no BLE edema, equal calf size).   Neurological: She is alert.   Movement visualized of LUE and LLE. No definitive movement visualized of RUE and RLE (known secondary deficit of last CVA in 2015)   Skin: Skin is warm and dry. She is not diaphoretic.   Psychiatric: She is noncommunicative.   Nursing note and vitals reviewed.      Critical Care  Performed by: CASS ESCOBAR  Authorized by: CASS ESCOBAR     Critical care provider statement:     Critical care time (minutes):  45    Critical care time was exclusive of:  Separately billable procedures and treating other patients    Critical care was necessary to treat or prevent imminent or life-threatening deterioration of the following conditions:  CNS failure or compromise    Critical care was time spent personally by me on the following activities:  Evaluation of patient's response to  treatment, examination of patient, obtaining history from patient or surrogate, ordering and performing treatments and interventions, ordering and review of laboratory studies, ordering and review of radiographic studies, re-evaluation of patient's condition, development of treatment plan with patient or surrogate, pulse oximetry, discussions with consultants and review of old charts               ED Course  ED Course as of Nov 02 2330 Fri Nov 02, 2018 2216 Dr. Gamboa updated family on current results. He discussed the risks and benefits of TPA in detail. He explained plan to consult with neurology.   [AT]   2220 Dr. Gamboa discussed case in detail with Dr. De Oliveira, neurology. Dr. De Oliveira reports right MCA infart. He has no desire to pursue intervention at this point.   [AT]   2228 Dr. Gamboa paged by [facility]. [facility] reports right MCA infarct with salvageable penumbra and no core infarct.   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 Gamboa discussed with Dr. Feliciano who agrees with plan for full dose Aspirin and no TPA  [AT]   2232 Dr. Gamboa updated family on consultations with Dr. De Oliveira and Dr. Feliciano. Family agrees with recommendations. Family understands and agrees with plan to make pt comfortable.   [AT]   2253 Dr. Gamboa paged hospitalist to discuss admission  [AT]   2323 Dr. Gamboa discussed with Dr. Cruz, hospitalist, who will admit.   [AT]      ED Course User Index  [AT] Kuldeep Gonzalez     Recent Results (from the past 24 hour(s))   POC Troponin, Rapid    Collection Time: 11/02/18 10:03 PM   Result Value Ref Range    Troponin I 0.00 0.00 - 0.07 ng/mL   POC Protime / INR    Collection Time: 11/02/18 10:07 PM   Result Value Ref Range    Protime 12.5 (L) 12.8 - 15.2 seconds    INR 1.0 0.8 - 1.2   POC CHEM 8    Collection Time: 11/02/18 10:07 PM   Result Value Ref Range    Glucose 166 (H) 70 - 130 mg/dL    BUN 14 8 - 26 mg/dL    Creatinine  0.50 (L) 0.60 - 1.30 mg/dL    Sodium 141 138 - 146 mmol/L    Potassium 4.2 3.5 - 4.9 mmol/L    Chloride 104 98 - 109 mmol/L    Total CO2 25 24 - 29 mmol/L    Hemoglobin 16.0 12.0 - 17.0 g/dL    Hematocrit 47 38 - 51 %    Ionized Calcium 1.29 1.20 - 1.32 mmol/L   aPTT    Collection Time: 11/02/18 10:24 PM   Result Value Ref Range    PTT 28.2 24.0 - 31.0 seconds   AST    Collection Time: 11/02/18 10:24 PM   Result Value Ref Range    AST (SGOT) 25 0 - 33 U/L   ALT    Collection Time: 11/02/18 10:24 PM   Result Value Ref Range    ALT (SGPT) 31 7 - 40 U/L   Light Blue Top    Collection Time: 11/02/18 10:24 PM   Result Value Ref Range    Extra Tube hold for add-on    Green Top (Gel)    Collection Time: 11/02/18 10:24 PM   Result Value Ref Range    Extra Tube Hold for add-ons.    Lavender Top    Collection Time: 11/02/18 10:24 PM   Result Value Ref Range    Extra Tube hold for add-on    CBC Auto Differential    Collection Time: 11/02/18 10:24 PM   Result Value Ref Range    WBC 11.17 (H) 3.50 - 10.80 10*3/mm3    RBC 5.19 (H) 3.89 - 5.14 10*6/mm3    Hemoglobin 15.5 11.5 - 15.5 g/dL    Hematocrit 47.8 (H) 34.5 - 44.0 %    MCV 92.1 80.0 - 99.0 fL    MCH 29.9 27.0 - 31.0 pg    MCHC 32.4 32.0 - 36.0 g/dL    RDW 13.4 11.3 - 14.5 %    RDW-SD 45.5 37.0 - 54.0 fl    MPV 9.9 6.0 - 12.0 fL    Platelets 440 150 - 450 10*3/mm3    Neutrophil % 70.1 41.0 - 71.0 %    Lymphocyte % 17.5 (L) 24.0 - 44.0 %    Monocyte % 8.4 0.0 - 12.0 %    Eosinophil % 2.9 0.0 - 3.0 %    Basophil % 0.7 0.0 - 1.0 %    Immature Grans % 0.4 0.0 - 0.6 %    Neutrophils, Absolute 7.84 1.50 - 8.30 10*3/mm3    Lymphocytes, Absolute 1.95 0.60 - 4.80 10*3/mm3    Monocytes, Absolute 0.94 0.00 - 1.00 10*3/mm3    Eosinophils, Absolute 0.32 (H) 0.00 - 0.30 10*3/mm3    Basophils, Absolute 0.08 0.00 - 0.20 10*3/mm3    Immature Grans, Absolute 0.04 (H) 0.00 - 0.03 10*3/mm3   Urinalysis With Microscopic If Indicated (No Culture) - Urine, Clean Catch    Collection Time: 11/02/18  10:38 PM   Result Value Ref Range    Color, UA Yellow Yellow, Straw    Appearance, UA Clear Clear    pH, UA 6.0 5.0 - 8.0    Specific Gravity, UA 1.068 (H) 1.001 - 1.030    Glucose, UA Negative Negative    Ketones, UA Negative Negative    Bilirubin, UA Negative Negative    Blood, UA Negative Negative    Protein, UA Negative Negative    Leuk Esterase, UA Negative Negative    Nitrite, UA Negative Negative    Urobilinogen, UA 0.2 E.U./dL 0.2 - 1.0 E.U./dL     Note: In addition to lab results from this visit, the labs listed above may include labs taken at another facility or during a different encounter within the last 24 hours. Please correlate lab times with ED admission and discharge times for further clarification of the services performed during this visit.    XR Chest 1 View   Final Result   No acute or focal cardiopulmonary process.       THIS DOCUMENT HAS BEEN ELECTRONICALLY SIGNED BY LUIS FERNANDO EDUARDO MD      CT Cerebral Perfusion With & Without Contrast   Final Result   Addendum 1 of 1   Findings were discussed by telephone with CASS Dobbs  on    11/2/2018    10:25 PM EDT, to expedite further management.         THIS DOCUMENT HAS BEEN ELECTRONICALLY SIGNED BY LUIS FERNANDO EDUARDO MD      Final      Entire right MCA distribution demonstrates ischemic changes but essentially no    loss of compensatory autoregulation, indicating potentially salvageable brain    tissue without evidence of a significant core infarct at this time.      THIS DOCUMENT HAS BEEN ELECTRONICALLY SIGNED BY LUIS FERNANDO EDUARDO MD      CT Head Without Contrast Stroke Protocol   Final Result   Addendum 1 of 1   Findings were discussed by telephone with CASS Dobbs  on    11/2/2018    10:04 PM EDT, to expedite further management.         THIS DOCUMENT HAS BEEN ELECTRONICALLY SIGNED BY LUIS FERNANDO EDUARDO MD      Final   No acute intracranial abnormality.      Atrophy, remote posterior division left MCA infarct and chronic   "  microangiopathic change in supratentorial white matter.       THIS DOCUMENT HAS BEEN ELECTRONICALLY SIGNED BY LUIS FERNANDO EDUARDO MD        Vitals:    11/02/18 2207 11/02/18 2211 11/02/18 2234   BP: 131/78     Pulse: 95     Resp: 18     Temp:   98.1 °F (36.7 °C)   TempSrc:   Oral   SpO2: 97%     Weight:  83 kg (182 lb 15.7 oz)    Height:  175.3 cm (69\")      Medications   sodium chloride 0.9 % flush 10 mL (not administered)   HYDROmorphone (DILAUDID) injection 0.5 mg (not administered)   iopamidol (ISOVUE-370) 76 % injection 150 mL (40 mL Intravenous Given 11/2/18 2210)   aspirin suppository 300 mg (300 mg Rectal Given 11/2/18 2238)     ECG/EMG Results (last 24 hours)     ** No results found for the last 24 hours. **                        MDM  Number of Diagnoses or Management Options  Acute ischemic right MCA stroke (CMS/HCC): new and requires workup  Diagnosis management comments: Dictation is consistent with an acute right MCA infarct.    The findings were discussed with Dr. De Oliveira, does not desire to pursue intervention at this time.    Patient has history of previous stroke and associated head bleed.  Therefore she does not take blood thinners at this time.  Only takes aspirin. These findings were discussed with the neurologist, Dr. Eldridge, who agrees the patient is not a TPA candidate. He suggests aspirin, and comfort care.     After further discussion with the family they desire to make her comfort care only.    I discussed the patient with the hospitalist, Dr. Cruz, who will admit to [facility].       Amount and/or Complexity of Data Reviewed  Clinical lab tests: ordered and reviewed  Tests in the radiology section of CPT®: ordered and reviewed  Obtain history from someone other than the patient: yes  Review and summarize past medical records: yes  Discuss the patient with other providers: yes  Independent visualization of images, tracings, or specimens: yes    Risk of Complications, Morbidity, and/or " Mortality  Presenting problems: high  Diagnostic procedures: high  Management options: high    Critical Care  Total time providing critical care: 30-74 minutes    Patient Progress  Patient progress: stable      Final diagnoses:   Acute ischemic right MCA stroke (CMS/[facility])       Documentation assistance provided by jethro Gonzalez.  Information recorded by the scribe was done at my direction and has been verified and validated by me.     Kuldeep Gonzalez  11/02/18 2229       Kuldeep Gonzalez  11/02/18 2231       Kuldeep Gonzalez  11/02/18 2253       Frederick Gamboa MD  11/02/18 [id number]

## 2018-11-03 NOTE — NURSING NOTE
Spoke with Dr Cruz in regards to entering in code status order and need for orders. Stated that he would call me back.

## 2018-11-03 NOTE — H&P
[facility]  HISTORY AND PHYSICAL     Patient Name: Luba Landaverde  : 1935  MRN: [id number]  Primary Care Physician: Morales Chris DO  Date of admission: 2018  Lives in LTC at [facility]     Subjective      Subjective      Chief Complaint:  Neurological change     HPI:  Luba Landaverde is a 83 y.o. female who presented to [facility] ED 18 evening for neurological change. Onset started this evening sometime after 1900. Constant. Severe. Pt is altered at baseline since a CVA in  where is non-verbal but knows her family and can point to communicate and interact. Moaning, not interacting since. Moans more only to painful stimuli. Associated with deviating eyes. Bruise to right tongue, thinks bit it. She vomited in ED. She is also now not moving her right extremities (left paralysis since  CVA). She was independent prior to that stroke, but since bedbound and living at a nursing home. A son and daughter are her guardians since then.     Ms. Landaverde now has a large area of ischemia to her right MCA. Her old stroke affected the left. She is not a candidate for TPA r/t the prior conversion/bleed. Options were discussed - goal is now purely comfort care and to not prolong suffering or poor QOL. There are two daughters (one the guardian Celia) and many grandchildren present.     Review of Systems   Unable to perform ROS: Mental status change      Otherwise 10-system ROS reviewed and is negative except as mentioned in the HPI.     Personal History      Medical History        Past Medical History:   Diagnosis Date   • Cancer (CMS/HCC)       breast cancer, ~, surgery only and tamoxifen, otherwise no chemo/radiation   • Dysphagia       nothing by mouth since stroke in ; via feeding tube    • Dysphagia       since CVA , strict NPO and PEG since. issues with aspiration if ever flat and not raised up. stomach issues with TF on higher rates, last  hospitalization earlier in 2018 and cutting TF rate helped.   • H/O: hysterectomy     • Hypertension     • Stroke (CMS/HCC) 2015     L sided weakness             Surgical History         Past Surgical History:   Procedure Laterality Date   • CHOLECYSTECTOMY       • GASTROSTOMY FEEDING TUBE INSERTION       • MASTECTOMY                Family History: family history is not on file.      Social History:  reports that she has quit smoking. She has never used smokeless tobacco.  Social History          Social History Narrative     Ms. Landaverde is an 83 year old white  female. She has lived in a nursing home since disabling CVA in 2015. Lived with grandson and his wife briefly before that and used to be independent until that life changing event. She has four children. She has grandchildren.         Medications:  Available home medication information reviewed            Allergies   Allergen Reactions   • Gadolinium Derivatives Anaphylaxis   • Codeine Unknown (See Comments)       Unknown, family thinks possibly trouble breathing   • Morphine Unknown (See Comments)       unknown   • Penicillins Hives   • Bactrim [Sulfamethoxazole-Trimethoprim] Rash   • Latex Rash   • Sulfa Antibiotics Rash            Objective      Objective      Vital Signs:   Temp:  [98.1 °F (36.7 °C)] 98.1 °F (36.7 °C)  Heart Rate:  [95] 95  Resp:  [18] 18  BP: (115-131)/(56-78) 115/56      Physical Exam     Resting in bed comfortably with eyes closed. Mouth open breathing, RR 22/min. Not in distress. Audible upper respiratory rattle initially and then calms. PEERL. Does not track. MM damp, tongue damp, moderate right tongue dark ecchymosis, reduced dentition but a tooth next to it. No tracheal deviation or JVD. Upper lobes clear, RML diminished/clear, bases diminished. RRR, no murmur. No appreciated LE edema, trace non-pitting RUE. ABD soft, rounded, no objective signs on palpation. PEG clamped. No objective signs of tenderness to bladder  palpation. To place comfort singleton. Skin warm/dry. Flat, lethargic. Two daughter and multiple grandchildren are present, supportive, attentive to pt and visit.     Results Reviewed:  I have personally reviewed current lab, radiology, and data and agree.             Lab Results (last 24 hours)      Procedure Component Value Units Date/Time     POC Troponin, Rapid [[id number]]  (Normal) Collected:  11/02/18 2203     Specimen:  Blood Updated:  11/02/18 2225       Troponin I 0.00 ng/mL         Comment: Serial Number: [id number]  [id number]        POC Protime / INR [[id number]]  (Abnormal) Collected:  11/02/18 2207     Specimen:  Blood Updated:  11/02/18 2215       Protime 12.5 (L) seconds         INR 1.0       Comment: Serial Number: [id number]  [id number]        POC CHEM 8 [[id number]]  (Abnormal) Collected:  11/02/18 2207     Specimen:  Blood Updated:  11/02/18 2215       Glucose 166 (H) mg/dL         BUN 14 mg/dL         Creatinine 0.50 (L) mg/dL         Sodium 141 mmol/L         Potassium 4.2 mmol/L         Chloride 104 mmol/L         Total CO2 25 mmol/L         Hemoglobin 16.0 g/dL         Comment: Serial Number: [id number]:  [id number]          Hematocrit 47 %         Ionized Calcium 1.29 mmol/L       CBC & Differential [[id number]] Collected:  11/02/18 2224     Specimen:  Blood Updated:  11/02/18 2246     Narrative:        The following orders were created for panel order CBC & Differential.  Procedure                               Abnormality         Status                     ---------                               -----------         ------                     CBC Auto Differential[[id number]]        Abnormal            Final result                  Please view results for these tests on the individual orders.     aPTT [[id number]]  (Normal) Collected:  11/02/18 2224     Specimen:  Blood Updated:  11/02/18 2300       PTT 28.2 seconds       Narrative:        PTT = The equivalent PTT values for the therapeutic  range of heparin levels at 0.3 to 0.5 U/ml are 55 to 70 seconds.     AST [[id number]]  (Normal) Collected:  11/02/18 2224     Specimen:  Blood Updated:  11/02/18 2259       AST (SGOT) 25 U/L       ALT [[id number]]  (Normal) Collected:  11/02/18 2224     Specimen:  Blood Updated:  11/02/18 2259       ALT (SGPT) 31 U/L       CBC Auto Differential [[id number]]  (Abnormal) Collected:  11/02/18 2224     Specimen:  Blood Updated:  11/02/18 2246       WBC 11.17 (H) 10*3/mm3         RBC 5.19 (H) 10*6/mm3         Hemoglobin 15.5 g/dL         Hematocrit 47.8 (H) %         MCV 92.1 fL         MCH 29.9 pg         MCHC 32.4 g/dL         RDW 13.4 %         RDW-SD 45.5 fl         MPV 9.9 fL         Platelets 440 10*3/mm3         Neutrophil % 70.1 %         Lymphocyte % 17.5 (L) %         Monocyte % 8.4 %         Eosinophil % 2.9 %         Basophil % 0.7 %         Immature Grans % 0.4 %         Neutrophils, Absolute 7.84 10*3/mm3         Lymphocytes, Absolute 1.95 10*3/mm3         Monocytes, Absolute 0.94 10*3/mm3         Eosinophils, Absolute 0.32 (H) 10*3/mm3         Basophils, Absolute 0.08 10*3/mm3         Immature Grans, Absolute 0.04 (H) 10*3/mm3       Urinalysis With Microscopic If Indicated (No Culture) - Urine, Clean Catch [[id number]]  (Abnormal) Collected:  11/02/18 2238     Specimen:  Urine from Urine, Catheter Updated:  11/02/18 2258       Color, UA Yellow       Appearance, UA Clear       pH, UA 6.0       Specific Gravity, UA 1.068 (H)       Glucose, UA Negative       Ketones, UA Negative       Bilirubin, UA Negative       Blood, UA Negative       Protein, UA Negative       Leuk Esterase, UA Negative       Nitrite, UA Negative       Urobilinogen, UA 0.2 E.U./dL     Narrative:        Urine microscopic not indicated.             Estimated Creatinine Clearance: 62.4 mL/min (A) (by C-G formula based on SCr of 0.5 mg/dL (L)).  Brief Urine Lab Results  (Last result in the past 365 days)       Color   Clarity   Blood   Leuk Est    Nitrite   Protein   CREAT   Urine HCG         11/02/18 2238 Yellow Clear Negative Negative Negative Negative                    No results found for: BNP          Imaging Results (last 24 hours)      Procedure Component Value Units Date/Time     XR Chest 1 View [[id number]] Collected:  11/02/18 2223       Updated:  11/02/18 2312     Narrative:        EXAM:    XR Chest, 1 View      EXAM DATE/TIME:    11/2/2018 10:23 PM      CLINICAL HISTORY:    83 years old, female; Cerebral infarction due to unspecified occlusion or   stenosis of right middle cerebral artery; Signs and symptoms; Other: Acute   stroke protocol (onset < 12 hrs); Additional info: Acute stroke protocol (onset   < 12 hrs)      TECHNIQUE:    XR of the chest, 1 view.      COMPARISON:    No relevant prior studies available.      FINDINGS:    Lungs:  Degree of lung inflation is normal. No evidence of pulmonary edema. No   focal consolidation or parenchymal lung mass.    Pleural space:  No pleural effusion or pneumothorax.    Heart/Mediastinum:  Cardiac silhouette appears normal.    Lymph nodes:  No adenopathy or hilar mass.    Bones/joints:  Bony structures are unremarkable.         Impression:        No acute or focal cardiopulmonary process.      THIS DOCUMENT HAS BEEN ELECTRONICALLY SIGNED BY JORGE ALLEN MD     CT Cerebral Perfusion With & Without Contrast [[id number]] Collected:  11/02/18 2201       Updated:  11/02/18 2225     Addenda:             Findings were discussed by telephone with CASS Dobbs  on   11/2/2018   10:25 PM EDT, to expedite further management.       THIS DOCUMENT HAS BEEN ELECTRONICALLY SIGNED BY JORGE ALLEN MD  Signed:  11/02/18 2225 by Jorge Allen MD     Narrative:        EXAM:  CT Brain Perfusion With Intravenous Contrast     EXAM DATE/TIME:  11/2/2018 10:01 PM     CLINICAL HISTORY:  83 years old, female; Signs and symptoms; Altered mental   status/memory loss; Additional info: Neuro deficit(s),  subacute     TECHNIQUE:  Axial computed tomography images of the brain with intravenous   contrast using cerebral perfusion protocol. Post-processing parametric maps   were created and reviewed. These include cerebral blood flow, cerebral blood   volume and mean transit time.  All CT scans at this facility use at least one of these dose optimization   techniques: automated exposure control; mA and/or kV adjustment per patient   size (includes targeted exams where dose is matched to clinical indication); or   iterative reconstruction.  MIP reconstructed images were created and reviewed.     COMPARISON:  CT HEAD WO CONTRAST STROKE PROTOCOL 11/2/2018 9:54 PM     FINDINGS:     Large area of abnormal perfusion parameters involving the right MCA   distribution. There is globally decreased cerebral blood flow in the entire   right MCA distribution, with marked elevation of mean transit time.     Cerebral blood volume perfusion map demonstrates preservation of cerebral blood   volume within the right MCA distribution suggesting intact autoregulatory   mechanism.     Remote contralateral posterior division left MCA infarct changes are noted   incidentally        Impression:           Entire right MCA distribution demonstrates ischemic changes but essentially no   loss of compensatory autoregulation, indicating potentially salvageable brain   tissue without evidence of a significant core infarct at this time.     THIS DOCUMENT HAS BEEN ELECTRONICALLY SIGNED BY JORGE ALLEN MD     CT Head Without Contrast Stroke Protocol [[id number]] Collected:  11/02/18 2156       Updated:  11/02/18 2205     Addenda:             Findings were discussed by telephone with CASS Dobbs  on   11/2/2018   10:04 PM EDT, to expedite further management.       THIS DOCUMENT HAS BEEN ELECTRONICALLY SIGNED BY JORGE ALLEN MD  Signed:  11/02/18 2205 by Jorge Allen MD     Narrative:        EXAM:  CT Head Without Intravenous  Contrast     EXAM DATE/TIME:  11/2/2018 9:56 PM     CLINICAL HISTORY:  83 years old, female; Signs and symptoms; Altered mental   status/memory loss; Additional info: Code stroke, last known normal at 1900   tonight, AMS, history of old stroke, eyes deviating to right, please document   scan time in report: 2155     TECHNIQUE:  Axial computed tomography images of the head/brain without   intravenous contrast.  All CT scans at this facility use at least one of these dose optimization   techniques: automated exposure control; mA and/or kV adjustment per patient   size (includes targeted exams where dose is matched to clinical indication); or   iterative reconstruction.     COMPARISON:  No relevant prior studies available.     FINDINGS:      No focal extracranial soft tissue swelling.      Prominent ventricles and CSF spaces suggest parenchymal volume loss.     No intracranial mass or midline shift.   No acute intracranial hemorrhage.   Mild decreased attenuation in periventricular/centrum semiovale white matter.     No focal effacement of cortical sulci to indicate acute cortical infarct.   Old infarct, posterior division left MCA distribution     No calvarial fracture or destructive process.   Partial opacification sphenoid and left maxillary sinuses.   Mastoid air cells are unremarkable.   Optic globes and orbits show no concerning deformity.         Impression:        No acute intracranial abnormality.     Atrophy, remote posterior division left MCA infarct and chronic   microangiopathic change in supratentorial white matter.      THIS DOCUMENT HAS BEEN ELECTRONICALLY SIGNED BY LUIS FERNANDO EDUARDO MD                Assessment/Plan      Assessment / Plan          Active Hospital Problems     Diagnosis   • Acute ischemic right MCA stroke (CMS/HCC)            Assessment & Plan:    1. CODE STATUS / GOALS OF CARE:  DNR/DNI, pure comfort care only. Do not prolong suffering or poor QOL. Will not use her PEG for TF or  hydration to prolong body during EOL care, should pt stabilize for some reason may reconsider but again do not want to use it to prolong poor quality. Teaching on presentation, differential, findings. Teaching on stroke involvement symptoms (hx these on right from prior one). Teaching on options and risk/benefit. Teaching on trajectory. Teaching on EOL care. Expect may pass inpatient in terminal care without being artificially prolonged. Will consult inpatient hospice in AM as an option for experts in EOL care and symptom management. Do not expect she should stabilize long enough to return to LTC with hospice, and based on GOC discussions we would not want to artificially keep her alive in poor QOL with TF/hydration. There was never an old LW found in past. Daughter Celia and son Matthew did go to court in 2015 to become her legal guardians, Celia present for all tonight.    2. Symptom management added. Comfort and being with family are our top goals to achieve. Teaching with family on what to look for, about communication on care and options. Teaching on symptom management options. We will opt to schedule some small ativan IV as hx severe anxiety and panic attacks, on chronic valium and very sensitive if ever misses a dose. Add PRN to it as well for anxiety/terminal agitation. Discussed allergies and thinks trouble breathing on codeine, unknown to morphine. Knows has tolerated hydrocodone well in the past when had it. No adverse effects noted to dilaudid tonight so far and will trial this one first. Continue her fentanyl patch, changed last 11/2, chronic med that works well - if PRN uses increase we can increase dose/add patch but discussed with family the slow onset of med/dose. Will place comfort singleton, discussed options with family. Add nausea meds. If persists, can try PEG to a singleton bag for drainage. Has not continued to vomit. NG last resort r/t comfort.    3. Will defer neuro consults at this time because  we are confident in EOL care. Consider consults PRN if would contribute to guiding comfort/QOL or info for guiding family decision making or expectations.    4. Pt is Shinto Jewish and a believer. Teaching on services available here and . Daughter Celia father in law is a  as well and will try to be by. Support given.    DVT prophylaxis: Comfort care, defer.     Admission Status:  I believe this patient meets INPATIENT status due to the need for care which can only be reasonably provided in an hospital setting such as aggressive/expedited ancillary services and/or consultation services, the necessity for IV medications, close physician monitoring and/or the possible need for procedures.  In such, I feel patient’s risk for adverse outcomes and need for care warrant INPATIENT evaluation and predict the patient’s care encounter to likely last beyond 2 midnights.        Electronically signed by SWETA Morrow, 11/03/18, 2:58 AM.    Brief Attending Admission Attestation     I have seen and examined the patient, performing an independent face-to-face diagnostic evaluation with plan of care reviewed and developed with the advanced practice clinician (APC).      Brief Summary Statement/HPI:   Luba Landaverde is a 83 y.o. female, a nursing home resident, was brought by EMS with stroke symptoms.  Symptoms reportedly started about 6:37 PM yesterday evening.  Patient has had a prior stroke and has been aphasic for a long time.  There is residual right-sided weakness secondary to the previous stroke in 2015.  Per nursing home report, patient suddenly became confused with persistent moaning at about 8:45 PM.  On presentation to the emergency room, head CT scan and cerebral perfusion imaging revealed a large area of abnormal perfusion parameters involving the right MCA distribution.  There is globally decreased cerebral blood flow to the entire right MCA distribution with marked elevation of mean  transit time.  Neurologist on call, Dr. De Oliveira was consulted and he did not recommend TPA administration.  Patient has had a serious adverse reaction to TPA in the past.  Family decided to make patient comfort only with palliative care. Will continue comfort measures only. Will consult palliative care in the morning.      Attending Physical Exam:  Constitutional: No acute distress but patient does not respond to verbal stimulus. Patient obviously cannot control her secretions at this time.  Eyes: PERRLA.  HHENT: Normocephalic and atraumatic, mucous membranes moist  Neck: Supple, no thyromegaly, no lymphadenopathy, trachea midline  Respiratory: Transmitted breath sound, nonlabored respirations   Cardiovascular: RRR, no murmurs, rubs, or gallops, palpable pedal pulses bilaterally  Gastrointestinal: Positive bowel sounds, soft, nontender, nondistended  Musculoskeletal: No bilateral ankle edema, no clubbing or cyanosis to extremities  Psychiatric: Unable to evaluate.  Neurologic: Patient does not follow commands.   Skin: No rashes      Brief Assessment/Plan :  See above for further detailed assessment and plan developed with APC which I have reviewed and/or edited.      Electronically signed by Keith Cruz MD, 11/03/18, 3:28 AM.

## 2018-11-03 NOTE — PROGRESS NOTES
Patient is an 83 YOF admitted post CVA 10% pps and is currently being treated at [facility] for symptoms of dying. History of breast CA in 2000 Patient had been living independently until CVA in 2015 with left sided paralysis, dysphagia requiring a peg tube and has been non-verbal but was communicative with family. She has been living in a SNF since that stroke. She began having neurologic changes on the evening of 11/2/18 was brought to [facility] ED vomiting had bit her tongue, She also presented with Right sided paralysis. A large area if ischemia to her right MCA she is not a candidate for TPA due to past bleed.  Family in agreement with comfort measure and agrees, Hospice POC formulated.

## 2018-11-04 PROCEDURE — 25010000002 LORAZEPAM PER 2 MG: Performed by: NURSE PRACTITIONER

## 2018-11-04 PROCEDURE — 25010000002 HYDROMORPHONE PER 4 MG: Performed by: NURSE PRACTITIONER

## 2018-11-04 PROCEDURE — 25010000002 HYDROMORPHONE 1 MG/ML SOLUTION: Performed by: NURSE PRACTITIONER

## 2018-11-04 RX ORDER — HYDROMORPHONE HYDROCHLORIDE 1 MG/ML
0.5 INJECTION, SOLUTION INTRAMUSCULAR; INTRAVENOUS; SUBCUTANEOUS
Status: DISCONTINUED | OUTPATIENT
Start: 2018-11-04 | End: 2018-11-05 | Stop reason: HOSPADM

## 2018-11-04 RX ORDER — HYDROMORPHONE HYDROCHLORIDE 1 MG/ML
0.25 INJECTION, SOLUTION INTRAMUSCULAR; INTRAVENOUS; SUBCUTANEOUS
Status: DISCONTINUED | OUTPATIENT
Start: 2018-11-04 | End: 2018-11-05 | Stop reason: HOSPADM

## 2018-11-04 RX ORDER — LORAZEPAM 2 MG/ML
0.5 INJECTION INTRAMUSCULAR EVERY 6 HOURS
Status: DISCONTINUED | OUTPATIENT
Start: 2018-11-04 | End: 2018-11-05 | Stop reason: HOSPADM

## 2018-11-04 RX ORDER — HYDROMORPHONE HYDROCHLORIDE 1 MG/ML
0.5 INJECTION, SOLUTION INTRAMUSCULAR; INTRAVENOUS; SUBCUTANEOUS EVERY 4 HOURS
Status: DISCONTINUED | OUTPATIENT
Start: 2018-11-04 | End: 2018-11-05 | Stop reason: HOSPADM

## 2018-11-04 RX ORDER — ATORVASTATIN CALCIUM 40 MG/1
40 TABLET, FILM COATED ORAL DAILY
Status: CANCELLED | OUTPATIENT
Start: 2018-11-04

## 2018-11-04 RX ADMIN — HYDROMORPHONE HYDROCHLORIDE 0.5 MG: 1 INJECTION, SOLUTION INTRAMUSCULAR; INTRAVENOUS; SUBCUTANEOUS at 23:03

## 2018-11-04 RX ADMIN — HYDROMORPHONE HYDROCHLORIDE 0.5 MG: 1 INJECTION, SOLUTION INTRAMUSCULAR; INTRAVENOUS; SUBCUTANEOUS at 21:46

## 2018-11-04 RX ADMIN — LORAZEPAM 0.5 MG: 2 INJECTION INTRAMUSCULAR; INTRAVENOUS at 20:56

## 2018-11-04 RX ADMIN — HYDROGEN PEROXIDE: 3 LIQUID TOPICAL at 20:57

## 2018-11-04 RX ADMIN — GLYCOPYRROLATE 0.4 MG: 0.2 INJECTION, SOLUTION INTRAMUSCULAR; INTRAVENOUS at 20:56

## 2018-11-04 RX ADMIN — LORAZEPAM 0.5 MG: 2 INJECTION INTRAMUSCULAR; INTRAVENOUS at 09:11

## 2018-11-04 RX ADMIN — LORAZEPAM 0.25 MG: 2 INJECTION INTRAMUSCULAR; INTRAVENOUS at 06:24

## 2018-11-04 RX ADMIN — LORAZEPAM 0.25 MG: 2 INJECTION INTRAMUSCULAR; INTRAVENOUS at 01:14

## 2018-11-04 RX ADMIN — HYDROMORPHONE HYDROCHLORIDE 0.5 MG: 1 INJECTION, SOLUTION INTRAMUSCULAR; INTRAVENOUS; SUBCUTANEOUS at 18:12

## 2018-11-04 RX ADMIN — GLYCOPYRROLATE 0.4 MG: 0.2 INJECTION, SOLUTION INTRAMUSCULAR; INTRAVENOUS at 14:27

## 2018-11-04 RX ADMIN — LORAZEPAM 0.5 MG: 2 INJECTION INTRAMUSCULAR; INTRAVENOUS at 23:03

## 2018-11-04 RX ADMIN — LORAZEPAM 0.5 MG: 2 INJECTION INTRAMUSCULAR; INTRAVENOUS at 04:30

## 2018-11-04 RX ADMIN — GLYCOPYRROLATE 0.4 MG: 0.2 INJECTION, SOLUTION INTRAMUSCULAR; INTRAVENOUS at 09:14

## 2018-11-04 RX ADMIN — HYDROMORPHONE HYDROCHLORIDE 0.5 MG: 1 INJECTION, SOLUTION INTRAMUSCULAR; INTRAVENOUS; SUBCUTANEOUS at 04:30

## 2018-11-04 RX ADMIN — HYDROMORPHONE HYDROCHLORIDE 0.5 MG: 1 INJECTION, SOLUTION INTRAMUSCULAR; INTRAVENOUS; SUBCUTANEOUS at 01:14

## 2018-11-04 RX ADMIN — HYDROGEN PEROXIDE: 3 LIQUID TOPICAL at 18:12

## 2018-11-04 RX ADMIN — GLYCOPYRROLATE 0.4 MG: 0.2 INJECTION, SOLUTION INTRAMUSCULAR; INTRAVENOUS at 02:22

## 2018-11-04 RX ADMIN — LIDOCAINE HYDROCHLORIDE 5 ML: 20 SOLUTION ORAL; TOPICAL at 21:46

## 2018-11-04 RX ADMIN — HYDROMORPHONE HYDROCHLORIDE 0.25 MG: 1 INJECTION, SOLUTION INTRAMUSCULAR; INTRAVENOUS; SUBCUTANEOUS at 02:22

## 2018-11-04 RX ADMIN — HYDROMORPHONE HYDROCHLORIDE 0.5 MG: 1 INJECTION, SOLUTION INTRAMUSCULAR; INTRAVENOUS; SUBCUTANEOUS at 06:24

## 2018-11-04 RX ADMIN — LIDOCAINE HYDROCHLORIDE 5 ML: 20 SOLUTION ORAL; TOPICAL at 18:12

## 2018-11-04 RX ADMIN — HYDROMORPHONE HYDROCHLORIDE 0.5 MG: 1 INJECTION, SOLUTION INTRAMUSCULAR; INTRAVENOUS; SUBCUTANEOUS at 09:14

## 2018-11-04 RX ADMIN — LORAZEPAM 0.5 MG: 2 INJECTION INTRAMUSCULAR; INTRAVENOUS at 14:27

## 2018-11-04 RX ADMIN — HYDROMORPHONE HYDROCHLORIDE 0.5 MG: 1 INJECTION, SOLUTION INTRAMUSCULAR; INTRAVENOUS; SUBCUTANEOUS at 14:27

## 2018-11-04 RX ADMIN — HYDROMORPHONE HYDROCHLORIDE 0.5 MG: 1 INJECTION, SOLUTION INTRAMUSCULAR; INTRAVENOUS; SUBCUTANEOUS at 11:04

## 2018-11-04 NOTE — PLAN OF CARE
Problem: Dying Patient, Actively (Adult)  Goal: Identify Related Risk Factors and Signs and Symptoms  Outcome: Ongoing (interventions implemented as appropriate)    Goal: Comfort/Pain Control  Outcome: Ongoing (interventions implemented as appropriate)    Goal: Peace/Preservation of Dignity During the Dying Process  Outcome: Ongoing (interventions implemented as appropriate)      Problem: Patient Care Overview  Goal: Plan of Care Review  Outcome: Ongoing (interventions implemented as appropriate)   11/04/18 0616   Coping/Psychosocial   Plan of Care Reviewed With family   Plan of Care Review   Progress declining   OTHER   Outcome Summary Family at bedside, dilaudid given with scheduled ativan, and Robinul given when available but pt still remains very gurgly and positioning on sides seems to make it worse, prn ativan and dilaudid given as available. The pt appears very comfortable other than occasional labored breathing and gurgling. Pt is hospice/AND and will continue to monitor.     Goal: Individualization and Mutuality  Outcome: Ongoing (interventions implemented as appropriate)    Goal: Discharge Needs Assessment  Outcome: Ongoing (interventions implemented as appropriate)    Goal: Interprofessional Rounds/Family Conf  Outcome: Ongoing (interventions implemented as appropriate)      Problem: Fall Risk (Adult)  Goal: Identify Related Risk Factors and Signs and Symptoms  Outcome: Ongoing (interventions implemented as appropriate)    Goal: Absence of Fall  Outcome: Ongoing (interventions implemented as appropriate)      Problem: Skin Injury Risk (Adult)  Goal: Identify Related Risk Factors and Signs and Symptoms  Outcome: Ongoing (interventions implemented as appropriate)    Goal: Skin Health and Integrity  Outcome: Ongoing (interventions implemented as appropriate)

## 2018-11-04 NOTE — PROGRESS NOTES
Hospice H&P     Attending Provider: Estela Lopez MD    Code Status:   Code Status and Medical Interventions:   Ordered at: 11/03/18 1696     Level Of Support Discussed With:    Health Care Surrogate    Next of Kin (If No Surrogate)     Code Status:    No CPR     Medical Interventions (Level of Support Prior to Arrest):    Comfort Measures     Comments:    hospice      Advanced Directives: Advance Directive Status: Patient has advance directive, copy in chart     Subjective   HPI:   Luba aLndaverde is a 83 y.o. female with CVA. PPS 10%. Follow-up visit for evaluation of needs and medication efficacy. PRN use noted. Pt with dyspnea and resp congestion over night. RN suctioned this am and family reports effective. Provided education on suctioning and resp congestion. OK to suction PRN, discussed risks and benefits. Family verbalizes understanding. Discussed some resp congestion is common throughout the dying process, sometimes medications help, sometimes they do not. Discussed if distressing to pt (cough/choking/etc.) RN can suction prn otherwise pt likely comfortable and secretions not distressing to her. Additional questions encouraged and answered. Family appreciative of care.       ROS: Unable to obtain r/t AMS/condition.       Past Medical History:   Diagnosis Date   • Cancer (CMS/HCC)     breast cancer, ~2000, surgery only and tamoxifen, otherwise no chemo/radiation   • Dysphagia     nothing by mouth since stroke in 2015; via feeding tube    • Dysphagia     since CVA 2015, strict NPO and PEG since. issues with aspiration if ever flat and not raised up. stomach issues with TF on higher rates, last hospitalization earlier in 2018 and cutting TF rate helped.   • H/O: hysterectomy    • Hypertension    • Stroke (CMS/HCC) 2015    L sided weakness      Past Surgical History:   Procedure Laterality Date   • CHOLECYSTECTOMY     • GASTROSTOMY FEEDING TUBE INSERTION     • MASTECTOMY       Social History     Social  History   • Marital status:      Spouse name: N/A   • Number of children: N/A   • Years of education: N/A     Occupational History   • Not on file.     Social History Main Topics   • Smoking status: Former Smoker   • Smokeless tobacco: Never Used      Comment: thinks maybe smoked once about 40 years ago for anxiety when got , just snuck some, not very long issue   • Alcohol use Not on file      Comment: no hx abuse   • Drug use: Unknown      Comment: no hx abuse   • Sexual activity: Defer     Other Topics Concern   • Not on file     Social History Narrative    Ms. Landaverde is an 83 year old white  female. She has lived in a nursing home since disabling CVA in 2015. Lived with grandson and his wife briefly before that and used to be independent until that life changing event. She has four children. She has grandchildren.     No family history on file.    Allergies   Allergen Reactions   • Gadolinium Derivatives Anaphylaxis   • Codeine Unknown (See Comments)     Unknown, family thinks possibly trouble breathing   • Morphine Unknown (See Comments)     unknown   • Penicillins Hives   • Bactrim [Sulfamethoxazole-Trimethoprim] Rash   • Latex Rash   • Sulfa Antibiotics Rash       Current Facility-Administered Medications   Medication Dose Route Frequency Provider Last Rate Last Dose   • acetaminophen (TYLENOL) suppository 650 mg  650 mg Rectal Q4H PRN Fadumo Ashraf APRN       • [START ON 11/5/2018] fentaNYL (DURAGESIC) 25 MCG/HR patch 1 patch  1 patch Transdermal Q72H Fadumo Ashraf APRN       • glycopyrrolate (ROBINUL) injection 0.4 mg  0.4 mg Intravenous Q6H PRN Estela Lopez MD   0.4 mg at 11/04/18 0914   • HYDROmorphone (DILAUDID) injection 0.25 mg  0.25 mg Intravenous Q1H PRN Fadumo Ashraf APRN        Or   • HYDROmorphone (DILAUDID) injection 0.5 mg  0.5 mg Intravenous Q1H PRN Fadumo Ashraf APRN       • HYDROmorphone (DILAUDID) injection 0.5 mg  0.5 mg  "Intravenous Q4H BrainardFadumo APRN   0.5 mg at 11/04/18 0914   • lidocaine viscous (XYLOCAINE) 2 % mouth solution 5 mL  5 mL Mouth/Throat 4x Daily Fadumo Ashraf APRN   5 mL at 11/03/18 1954   • LORazepam (ATIVAN) injection 0.25 mg  0.25 mg Intravenous Q4H PRN Fadumo Ashraf APRN   0.25 mg at 11/04/18 0114    Or   • LORazepam (ATIVAN) injection 0.5 mg  0.5 mg Intravenous Q4H PRN BrainardFadumo mccartney APRN   0.5 mg at 11/04/18 0430   • LORazepam (ATIVAN) injection 0.5 mg  0.5 mg Intravenous Q6H BrainardFadumo APRN   0.5 mg at 11/04/18 0911   • LORazepam (ATIVAN) injection 2 mg  2 mg Intravenous Q15 Min PRN Fadumo Ashraf APRN       • ondansetron (ZOFRAN) injection 4 mg  4 mg Intravenous Q6H PRN Fadumo Ashraf APRN       • palliative care oral rinse   Mouth/Throat 4x Daily Fadumo Ashraf APRN       • [START ON 11/6/2018] Scopolamine (TRANSDERM-SCOP) 1.5 MG/3DAYS patch 1 patch  1 patch Transdermal Q72H Fadumo Ashraf APRN            •  acetaminophen  •  glycopyrrolate  •  HYDROmorphone **OR** HYDROmorphone  •  LORazepam **OR** LORazepam  •  LORazepam  •  ondansetron    Current medication reviewed for route, type, dose and frequency and are current per MAR at time of dictation.    Objective     /56   Pulse 98   Temp 98 °F (36.7 °C) (Oral)   Resp 18   Ht 175.3 cm (69.02\")   Wt 83 kg (182 lb 15.7 oz)   SpO2 98%   BMI 27.01 kg/m²     Intake/Output Summary (Last 24 hours) at 11/04/18 1047  Last data filed at 11/03/18 1800   Gross per 24 hour   Intake              250 ml   Output                0 ml   Net              250 ml       PPS: 10%  Physical Examination:   General Appearance:    Unresponsive to light verbal/tactile stimuli, NAD, face relaxed   HEENT:    NC/AT, eyes closed, dry MM   Neck:   supple, trachea midline, no JVD   Lungs:     Few scattered rhonchi upper lobes bilat- improved from yesterday, intermittent audible upper " airway congestion noted respirations regular, R 24, even and heavy with mild labor    Heart:    RRR, S1 and S2, no M/R/G   Abdomen:     Normal bowel sounds, soft, non-distended, PEG noted   Extremities:   No movement bilateral upper/lower extremities, no edema   Pulses:   Pulses palpable and equal bilaterally   Skin:   BUE hands cool, BLE warm    Neurologic:   Unresponsive to exam   Psych:   Calm         Labs:     Results from last 7 days  Lab Units 11/02/18  2224   WBC 10*3/mm3 11.17*   HEMOGLOBIN g/dL 15.5   HEMATOCRIT % 47.8*   PLATELETS 10*3/mm3 440       Results from last 7 days  Lab Units 11/02/18  2207   CREATININE mg/dL 0.50*       Results from last 7 days  Lab Units 11/02/18 2224 11/02/18 2207   CREATININE mg/dL  --  0.50*   ALT (SGPT) U/L 31  --    AST (SGOT) U/L 25  --      Imaging Results (last 72 hours)     ** No results found for the last 72 hours. **          Diagnostics/Clinical Data: Reviewed    Assessment/Plan    Patient Active Problem List   Diagnosis   • Hemiparesis affecting right side as late effect of cerebrovascular accident (CVA) (CMS/[facility])   • Oropharyngeal dysphagia   • Essential hypertension   • Aphasia as late effect of cerebrovascular accident (CVA)   • Impaired mobility and ADLs   • Physical debility   • Gastrostomy status (CMS/[facility])   • Acute ischemic right MCA stroke (CMS/[facility])   • CVA (cerebral vascular accident) (CMS/[facility])       Assessment/Problems:   1.Anxiety- increased sched ativan dose to 0.5mg q6h   2. Agitation/Restlessness- prn ativan  3. Pain- continue fentanyl (home med), prn dialudid avialable  4. Dyspnea- sched dilaudid 0.5mg q4h based on prn use.  5. Risk for Seizure- prn ativan seizure dosing  6. Resp congestion- continue scop patch and prn robinul; OK to suction PRN- discussed resp congestion normal part of dying process with family     Goals of Care: Comfort Only  Family/Support/Spiritual: Pt with 4 children; granddaughter at bedside at time of visit    Plan:   Medications adjustments as above.  Had long conversation with pt family at bedside regarding s/s of death and dying, symptom management and medication adjustments. In particular discussed secretions and resp congestion. Family is appreciative of information and is in agreement with above POC. Discussed with nursing to medicate     Justification: Patient meets criteria for Acute In-patient Care due to requires frequent nursing care and assessment, IV/subcut medications for symptom management.    Time:30 min >50 % of time spent in conversation providing education to family as outlined above.    Fadumo Ashraf, APRN  11/4/2018      EMR Dragon/Transcription disclaimer:  Much of this encounter note is an electronic transcription/translation of spoken language to printed text. Electronic translation of spoken language may permit erroneous, or at times, nonsensical words or phrases to be inadvertently transcribed. Although I have reviewed the note for such errors, some may still exist.

## 2018-11-04 NOTE — PROGRESS NOTES
Patient having significant rhonchi required suctioning multiple times during the night, thick tan/brown phlegm was returned. Family very distressed and tired. Patient is reported by family to have become hypoxic during the night due to mucus in her throat. Education done on secretions and patients comfort and most likely will be a recurring theme and family rotates. Side lying encouraged, HOB elevation, pros and cones of suctioning reviewed.  New orders received for management of secretions and family supported.

## 2018-11-05 PROCEDURE — 25010000002 LORAZEPAM PER 2 MG: Performed by: NURSE PRACTITIONER

## 2018-11-05 PROCEDURE — 25010000002 HYDROMORPHONE PER 4 MG: Performed by: NURSE PRACTITIONER

## 2018-11-05 RX ADMIN — GLYCOPYRROLATE 0.4 MG: 0.2 INJECTION, SOLUTION INTRAMUSCULAR; INTRAVENOUS at 03:25

## 2018-11-05 RX ADMIN — LORAZEPAM 0.5 MG: 2 INJECTION INTRAMUSCULAR; INTRAVENOUS at 03:25

## 2018-11-05 RX ADMIN — HYDROMORPHONE HYDROCHLORIDE 0.5 MG: 1 INJECTION, SOLUTION INTRAMUSCULAR; INTRAVENOUS; SUBCUTANEOUS at 03:25

## 2018-11-05 NOTE — SIGNIFICANT NOTE
Exam confirms with auscultation zero audible heart tones and zero audible respirations. Ms.Rose Terrie Landaverde was pronounced dead at 05:55am on 11/5/2018.  MD notified by Patient's RN.    Juan Mccullough RN  Clinical House Supervisor  11/5/2018 6:13 AM

## 2018-11-05 NOTE — PLAN OF CARE
Problem: Dying Patient, Actively (Adult)  Goal: Identify Related Risk Factors and Signs and Symptoms  Outcome: Ongoing (interventions implemented as appropriate)    Goal: Comfort/Pain Control  Outcome: Ongoing (interventions implemented as appropriate)    Goal: Peace/Preservation of Dignity During the Dying Process  Outcome: Ongoing (interventions implemented as appropriate)      Problem: Patient Care Overview  Goal: Plan of Care Review  Outcome: Ongoing (interventions implemented as appropriate)   11/05/18 0513   Coping/Psychosocial   Plan of Care Reviewed With patient   Plan of Care Review   Progress declining   OTHER   Outcome Summary Family at bedside. PRN's and scheduled meds given. Pt resting comfortably.      Goal: Individualization and Mutuality  Outcome: Ongoing (interventions implemented as appropriate)    Goal: Discharge Needs Assessment  Outcome: Ongoing (interventions implemented as appropriate)    Goal: Interprofessional Rounds/Family Conf  Outcome: Ongoing (interventions implemented as appropriate)      Problem: Fall Risk (Adult)  Goal: Identify Related Risk Factors and Signs and Symptoms  Outcome: Ongoing (interventions implemented as appropriate)    Goal: Absence of Fall  Outcome: Ongoing (interventions implemented as appropriate)      Problem: Skin Injury Risk (Adult)  Goal: Identify Related Risk Factors and Signs and Symptoms  Outcome: Outcome(s) achieved Date Met: 11/05/18    Goal: Skin Health and Integrity  Outcome: Ongoing (interventions implemented as appropriate)

## 2018-11-15 NOTE — PROGRESS NOTES
Nursing Home Progress Note      Patient Name: Luba Landaverde   YOB: 1935    Date of Service: 9/5/2018      Facility: [place] []   [place] []   [facility] []   [facility] [x]   Other []       CHIEF COMPLAINT:  CMM/LTC     HISTORY OF PRESENT ILLNESS:  [x]  Follow Up visit for coordination of long term care issues and chronic medical management needs.    Hypertension/oral pharyngeal dysphasia/hemiparesis of right side secondary to CVA/aphasia secondary to CVA/impaired mobility and ADLs    PAST MEDICAL & SURGICAL HISTORY:  Past Medical History:   Diagnosis Date   • Cancer (CMS/HCC)     breast cancer, ~2000, surgery only and tamoxifen, otherwise no chemo/radiation   • Dysphagia     nothing by mouth since stroke in 2015; via feeding tube    • Dysphagia     since CVA 2015, strict NPO and PEG since. issues with aspiration if ever flat and not raised up. stomach issues with TF on higher rates, last hospitalization earlier in 2018 and cutting TF rate helped.   • H/O: hysterectomy    • Hypertension    • Stroke (CMS/HCC) 2015    L sided weakness       Past Surgical History:   Procedure Laterality Date   • CHOLECYSTECTOMY     • GASTROSTOMY FEEDING TUBE INSERTION     • MASTECTOMY          MEDICATIONS:  Medication administration record for Luba Landaverde reviewed and reconciled today.    ALLERGIES:  Allergies   Allergen Reactions   • Gadolinium Derivatives Anaphylaxis   • Codeine Unknown (See Comments)     Unknown, family thinks possibly trouble breathing   • Morphine Unknown (See Comments)     unknown   • Penicillins Hives   • Bactrim [Sulfamethoxazole-Trimethoprim] Rash   • Latex Rash   • Sulfa Antibiotics Rash        REVIEW OF SYSTEMS:  • Appetite: Fair []   Good []   Poor []   Weight Loss []  []  Weight Stable   Unavoidable Weight Loss []  Tolerating Tube Feeding [x]    Supplements Provided []   • Constitutional: Negative for fever, chills, diaphoresis or fatigue and weight change.   • HENT: No dysphagia; no  changes to vision/hearing/smell/taste; no epistaxis  • Eyes: Negative for redness and visual disturbance.   • Respiratory: Negative for shortness of breath, chest pain, cough or chest tightness.   • Cardiovascular: Negative for chest pain and palpitations.   • Gastrointestinal: Negative for abdominal distention, abdominal pain and blood in stool.   • Endocrine: Negative for cold intolerance and heat intolerance.   • Genitourinary: Negative for difficulty urinating, dysuria and frequency.Negative for hematuria   • Musculoskeletal: Chronic myalgias and arthralgias  • Integumentary: No open wounds, rash or concerning skin lesions  • Neurological: Negative for syncope, weakness and headaches.   • Hematological: Negative for adenopathy. Does not bruise/bleed easily.   • Immunological: Negative for reported allergies or immunological disorders  • Psychological: No depression, anxiety or suicidal ideation    PHYSICAL EXAMINATION:  VITAL SIGNS: /80, HR 70 bpm, RR 20, weight 135    General Appearance:  [x]  Alert   [x]  Oriented x person  [x]  No acute distress    [x]  Confused  []  Disoriented   []  Comatose   Head:  Atraumatic and normocephalic, without obvious abnormality.   Eyes:          PERRLA, conjunctivae and sclerae normal, no Icterus. No pallor. Extra-occular movements are within normal limits.   Ears:  Ears appear intact with no abnormalities noted.   Throat: No oral lesions, no thrush, oral mucosa moist.   Neck: Supple, trachea midline, no thyromegaly, no carotid bruit.   Back:   No kyphoscoliosis. No tenderness to palpation.   Lungs:   Chest shape is normal.  Audible air exchange noted all lung fields.  No wheezing.    Heart:  Normal S1 and S2, no murmur, no gallop, no rub. No JVD.   Abdomen:   Normal bowel sounds, no masses, no organomegaly. Soft, non-tender, non-distended, no guarding .  G-tube functioning and in good position.     Extremities: Chronic neuromuscular deficits of known CVA, cyanosis or  clubbing.  Frail build.    Pulses: Pulses palpable and equal bilaterally.   Skin: No bleeding or rash.  Generalized dry skin noted.  Age-related atrophy of skin.   Neurologic: [] Normal speech []  Normal mental status    [x] Cranial nerves II through XII intact   [x]  No anosmia [x]  DTR 2+ [x]  Proprioception intact  []  No focal motor/sensory deficits  Chronic neuromuscular/aphasic deficits of CVA     Psych/Mood:        [x]  No acute changes []  Depressed  Urinary:        []  Continent  [x]  Incontinent  []  Retention  []  F/C     []  UTI w/treatment in progress  RECORD REVIEW:   • Consult notes []   • Admission and subsequent notes []   •  [x] I have personally reviewed and interpreted available lab data, radiology studies and ECG obtained at time of visit.  [x] Medication Review: I have reviewed the patients active and prn medications at [facility]     ASSESSMENT   Diagnoses and all orders for this visit:    Hemiparesis affecting right side as late effect of cerebrovascular accident (CVA) (CMS/HCC)    Aphasia as late effect of cerebrovascular accident (CVA)    Impaired mobility and ADLs    Physical debility    Gastrostomy status (CMS/HCC)    Oropharyngeal dysphagia    Acute ischemic right MCA stroke (CMS/HCC)    Essential hypertension        PLAN  Continue supportive care as needed for ADLs/mobility/transfers.  Tolerating tube feeding regimen without difficulty at this time, no reports of residuals.  No reports of any focal aspirations, continue aspiration precautions.  Blood pressure is at.  Routine surveillance labs.  Noted weight gain of 2 pounds.    [x]  Discussed Patient in detail with nursing/staff, addressed all needs today.     [x]  Plan of Care Reviewed   []   PT/OT Reviewed   []  Order Changes  []   Discharge Plans Reviewed         Total face to face time spent with patient 25 minutes, 15 min in counseling.    Morales Chris DO.  9/5/2018

## 2018-11-16 NOTE — DISCHARGE SUMMARY
Date of Death:  2018  Time of Death:  0555    Presenting Problem/History of Present Illness    CVA (cerebral vascular accident) (CMS/[facility])    Hospital Course  83 y.o. female with PMH significant for HTN, Breast CA (2000 s/p tamoxifen and mastectomy), and prior CVA in  with left hemiparesis and dysphagia requiring G-tube was admitted to Loc[facility] on   from NH r/t AMS and new right paralysis. Work-up revealed a large ischemic infarct to right MCA; she was unable to receive TPA r/t previous hemorraghic conversion.  Given pt previously debilitated state and new CVA, her  family elected to pursue a complete comfort focused POC.    Pt was admitted to inpatient hospice on 11/3/18 for symptom management of anxiety, restlessness, pain, and resp congestion. PPS was 10%, she was unresponsive with 4 children and many grandchildren at bedside. She was started on scheduled  ativan q6h (was on sched benzo in NH) with PRN also available; topical fentanyl was continued with PRN Dilaudid added for pain or dyspnea; scopolamine patch/ PRN IV robinul were added for terminal secretions.     Ms. Landaverde  peacefully on  @ 0555 with family at bedside.     Social History:  Social History     Tobacco Use   • Smoking status: Former Smoker   • Smokeless tobacco: Never Used   • Tobacco comment: thinks maybe smoked once about 40 years ago for anxiety when got , just snuck some, not very long issue   Substance Use Topics   • Alcohol use: Not on file     Comment: no hx abuse     Consults:   Consults     No orders found from 10/5/2018 to 2018.        Hilary Epstein, APRN  18  4:17 PM

## 2018-12-19 NOTE — PROGRESS NOTES
Nursing Home Progress Note      Patient Name: Luba Landaverde   YOB: 1935    Date of Service: 9/19/2018      Facility: [place] []   [place] []   [facility] []   [facility] [x]   Other []       CHIEF COMPLAINT:  CMM/LTC     HISTORY OF PRESENT ILLNESS:  [x]  Follow Up visit for coordination of long term care issues and chronic medical management needs.    Hypertension/oral pharyngeal dysphasia/hemiparesis of right side secondary to CVA/aphasia secondary to CVA/impaired mobility and ADLs    PAST MEDICAL & SURGICAL HISTORY:  Past Medical History:   Diagnosis Date   • Cancer (CMS/HCC)     breast cancer, ~2000, surgery only and tamoxifen, otherwise no chemo/radiation   • Dysphagia     nothing by mouth since stroke in 2015; via feeding tube    • Dysphagia     since CVA 2015, strict NPO and PEG since. issues with aspiration if ever flat and not raised up. stomach issues with TF on higher rates, last hospitalization earlier in 2018 and cutting TF rate helped.   • H/O: hysterectomy    • Hypertension    • Stroke (CMS/HCC) 2015    L sided weakness       Past Surgical History:   Procedure Laterality Date   • CHOLECYSTECTOMY     • GASTROSTOMY FEEDING TUBE INSERTION     • MASTECTOMY          MEDICATIONS:  Medication administration record for Luba Landaverde reviewed and reconciled today.    ALLERGIES:  Allergies   Allergen Reactions   • Gadolinium Derivatives Anaphylaxis   • Codeine Unknown (See Comments)     Unknown, family thinks possibly trouble breathing   • Morphine Unknown (See Comments)     unknown   • Penicillins Hives   • Bactrim [Sulfamethoxazole-Trimethoprim] Rash   • Latex Rash   • Sulfa Antibiotics Rash        REVIEW OF SYSTEMS:  • Appetite: Fair []   Good []   Poor []   Weight Loss []  []  Weight Stable   Unavoidable Weight Loss []  Tolerating Tube Feeding [x]    Supplements Provided []   • Constitutional: Negative for fever, chills, diaphoresis or fatigue and weight change.   • HENT: No dysphagia; no  changes to vision/hearing/smell/taste; no epistaxis  • Eyes: Negative for redness and visual disturbance.   • Respiratory: Negative for shortness of breath, chest pain, cough or chest tightness.   • Cardiovascular: Negative for chest pain and palpitations.   • Gastrointestinal: Negative for abdominal distention, abdominal pain and blood in stool.   • Endocrine: Negative for cold intolerance and heat intolerance.   • Genitourinary: Negative for difficulty urinating, dysuria and frequency.Negative for hematuria   • Musculoskeletal: Chronic myalgias and arthralgias  • Integumentary: No open wounds, rash or concerning skin lesions  • Neurological: Negative for syncope, weakness and headaches.   • Hematological: Negative for adenopathy. Does not bruise/bleed easily.   • Immunological: Negative for reported allergies or immunological disorders  • Psychological: No depression, anxiety or suicidal ideation    PHYSICAL EXAMINATION:  VITAL SIGNS: /64, HR 68 bpm, RR 20, weight 135    General Appearance:  [x]  Alert   [x]  Oriented x person  [x]  No acute distress    [x]  Confused  []  Disoriented   []  Comatose   Head:  Atraumatic and normocephalic, without obvious abnormality.   Eyes:          PERRLA, conjunctivae and sclerae normal, no Icterus. No pallor. Extra-occular movements are within normal limits.   Ears:  Ears appear intact with no abnormalities noted.   Throat: No oral lesions, no thrush, oral mucosa moist.   Neck: Supple, trachea midline, no thyromegaly, no carotid bruit.   Back:   No kyphoscoliosis. No tenderness to palpation.   Lungs:   Chest shape is normal.  Audible air exchange noted all lung fields.  No wheezing.    Heart:  Normal S1 and S2, no murmur, no gallop, no rub. No JVD.   Abdomen:   Normal bowel sounds, no masses, no organomegaly. Soft, non-tender, non-distended, no guarding .  G-tube functioning and in good position.     Extremities: Chronic neuromuscular deficits of known CVA, cyanosis or  clubbing.  Frail build.    Pulses: Pulses palpable and equal bilaterally.   Skin: No bleeding or rash.  Generalized dry skin noted.  Age-related atrophy of skin.   Neurologic: [] Normal speech []  Normal mental status    [x] Cranial nerves II through XII intact   [x]  No anosmia [x]  DTR 2+ [x]  Proprioception intact  []  No focal motor/sensory deficits  Chronic neuromuscular/aphasic deficits of CVA     Psych/Mood:        [x]  No acute changes []  Depressed  Urinary:        []  Continent  [x]  Incontinent  []  Retention  []  F/C     []  UTI w/treatment in progress  RECORD REVIEW:   • Consult notes []   • Admission and subsequent notes []   •  [x] I have personally reviewed and interpreted available lab data, radiology studies and ECG obtained at time of visit.  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  Tolerating tube feeding regimen without difficulty at this time, without reports of residuals.  No reports of any focal/recurrent aspirations, continue aspiration precautions.  Blood pressure is at.  Routine surveillance labs.  Noted weight stability, no change.    [x]  Discussed Patient in detail with nursing/staff, addressed all needs today.     [x]  Plan of Care Reviewed   []   PT/OT Reviewed   []  Order Changes  []   Discharge Plans Reviewed         Total face to face time spent with patient 25 minutes, 15 min in counseling.      9/19/2018

## 2018-12-28 NOTE — PROGRESS NOTES
Nursing Home Progress Note      Patient Name: Luba Landaverde   YOB: 1935    Date of Service: 10/03/18    Facility: [place] []   [place] []   [facility] []   [facility] [x]   Other []       CHIEF COMPLAINT:  CMM/LTC     HISTORY OF PRESENT ILLNESS:  [x]  Follow Up visit for coordination of long term care issues and chronic medical management needs.    Hypertension/oral pharyngeal dysphasia/hemiparesis of right side secondary to CVA/aphasia secondary to CVA/impaired mobility and ADLs    PAST MEDICAL & SURGICAL HISTORY:  Past Medical History:   Diagnosis Date   • Cancer (CMS/HCC)     breast cancer, ~2000, surgery only and tamoxifen, otherwise no chemo/radiation   • Dysphagia     nothing by mouth since stroke in 2015; via feeding tube    • Dysphagia     since CVA 2015, strict NPO and PEG since. issues with aspiration if ever flat and not raised up. stomach issues with TF on higher rates, last hospitalization earlier in 2018 and cutting TF rate helped.   • H/O: hysterectomy    • Hypertension    • Stroke (CMS/HCC) 2015    L sided weakness       Past Surgical History:   Procedure Laterality Date   • CHOLECYSTECTOMY     • GASTROSTOMY FEEDING TUBE INSERTION     • MASTECTOMY          MEDICATIONS:  Medication administration record for Luba Landaverde reviewed and reconciled today.    ALLERGIES:  Allergies   Allergen Reactions   • Gadolinium Derivatives Anaphylaxis   • Codeine Unknown (See Comments)     Unknown, family thinks possibly trouble breathing   • Morphine Unknown (See Comments)     unknown   • Penicillins Hives   • Bactrim [Sulfamethoxazole-Trimethoprim] Rash   • Latex Rash   • Sulfa Antibiotics Rash        REVIEW OF SYSTEMS:  • Appetite: Fair []   Good []   Poor []   Weight Loss []  []  Weight Stable   Unavoidable Weight Loss []  Tolerating Tube Feeding [x]    Supplements Provided []   • Constitutional: Negative for fever, chills, diaphoresis or fatigue and weight change.   • HENT: No dysphagia; no  changes to vision/hearing/smell/taste; no epistaxis  • Eyes: Negative for redness and visual disturbance.   • Respiratory: Negative for shortness of breath, chest pain, cough or chest tightness.   • Cardiovascular: Negative for chest pain and palpitations.   • Gastrointestinal: Negative for abdominal distention, abdominal pain and blood in stool.   • Endocrine: Negative for cold intolerance and heat intolerance.   • Genitourinary: Negative for difficulty urinating, dysuria and frequency.Negative for hematuria   • Musculoskeletal: Chronic myalgias and arthralgias  • Integumentary: No open wounds, rash or concerning skin lesions  • Neurological: Negative for syncope, weakness and headaches.   • Hematological: Negative for adenopathy. Does not bruise/bleed easily.   • Immunological: Negative for reported allergies or immunological disorders  • Psychological: No depression, anxiety or suicidal ideation    PHYSICAL EXAMINATION:  VITAL SIGNS: /60  HR 68  RR 20  WEIGHT 138    General Appearance:  [x]  Alert   [x]  Oriented x person  [x]  No acute distress    [x]  Confused  []  Disoriented   []  Comatose   Head:  Atraumatic and normocephalic, without obvious abnormality.   Eyes:          PERRLA, conjunctivae and sclerae normal, no Icterus. No pallor. Extra-occular movements are within normal limits.   Ears:  Ears appear intact with no abnormalities noted.   Throat: No oral lesions, no thrush, oral mucosa moist.   Neck: Supple, trachea midline, no thyromegaly, no carotid bruit.   Back:   No kyphoscoliosis. No tenderness to palpation.   Lungs:   Chest shape is normal.  Audible air exchange noted all lung fields.  No wheezing.    Heart:  Normal S1 and S2, no murmur, no gallop, no rub. No JVD.   Abdomen:   Normal bowel sounds, no masses, no organomegaly. Soft, non-tender, non-distended, no guarding .  G-tube functioning and in good position.     Extremities: Chronic neuromuscular deficits of known CVA, cyanosis or  clubbing.  Frail build.    Pulses: Pulses palpable and equal bilaterally.   Skin: No bleeding or rash.  Generalized dry skin noted.  Age-related atrophy of skin.   Neurologic: [] Normal speech []  Normal mental status    [x] Cranial nerves II through XII intact   [x]  No anosmia [x]  DTR 2+ [x]  Proprioception intact  []  No focal motor/sensory deficits  Chronic neuromuscular/aphasic deficits of CVA     Psych/Mood:        [x]  No acute changes []  Depressed  Urinary:        []  Continent  [x]  Incontinent  []  Retention  []  F/C     []  UTI w/treatment in progress  RECORD REVIEW:   • Consult notes []   • Admission and subsequent notes []   •  [x] I have personally reviewed and interpreted available lab data, radiology studies and ECG obtained at time of visit.  [x] Medication Review: I have reviewed the patients active and prn medications at [facility]     ASSESSMENT   Diagnoses and all orders for this visit:    Acute ischemic right MCA stroke (CMS/HCC)    Aphasia as late effect of cerebrovascular accident (CVA)    Essential hypertension    Gastrostomy status (CMS/HCC)    Hemiparesis affecting right side as late effect of cerebrovascular accident (CVA) (CMS/HCC)    Impaired mobility and ADLs    Oropharyngeal dysphagia    Physical debility        PLAN  Continue supportive care as needed for ADLs/mobility/transfers.  Tolerating tube feeding regimen without difficulty at this time, without reports of residuals.  No reports of any focal/recurrent aspirations, continue aspiration precautions.  Blood pressure is at goal.  Routine surveillance labs as needed.  Noted weight gain of 3 pounds, no change to appetite.    [x]  Discussed Patient in detail with nursing/staff, addressed all needs today.     [x]  Plan of Care Reviewed   []   PT/OT Reviewed   []  Order Changes  []   Discharge Plans Reviewed         Total face to face time spent with patient 25 minutes, 15 min in counseling.      10/03/18

## 2018-12-28 NOTE — PROGRESS NOTES
Nursing Home Progress Note      Patient Name: Luba Landaverde   YOB: 1935    Date of Service: 10/17/18    Facility: [place] []   [place] []   [facility] []   [facility] [x]   Other []       CHIEF COMPLAINT:  CMM/LTC     HISTORY OF PRESENT ILLNESS:  [x]  Follow Up visit for coordination of long term care issues and chronic medical management needs.    Hypertension/oral pharyngeal dysphasia/hemiparesis of right side secondary to CVA/aphasia secondary to CVA/impaired mobility and ADLs    PAST MEDICAL & SURGICAL HISTORY:  Past Medical History:   Diagnosis Date   • Cancer (CMS/HCC)     breast cancer, ~2000, surgery only and tamoxifen, otherwise no chemo/radiation   • Dysphagia     nothing by mouth since stroke in 2015; via feeding tube    • Dysphagia     since CVA 2015, strict NPO and PEG since. issues with aspiration if ever flat and not raised up. stomach issues with TF on higher rates, last hospitalization earlier in 2018 and cutting TF rate helped.   • H/O: hysterectomy    • Hypertension    • Stroke (CMS/HCC) 2015    L sided weakness       Past Surgical History:   Procedure Laterality Date   • CHOLECYSTECTOMY     • GASTROSTOMY FEEDING TUBE INSERTION     • MASTECTOMY          MEDICATIONS:  Medication administration record for Luba Landaverde reviewed and reconciled today.    ALLERGIES:  Allergies   Allergen Reactions   • Gadolinium Derivatives Anaphylaxis   • Codeine Unknown (See Comments)     Unknown, family thinks possibly trouble breathing   • Morphine Unknown (See Comments)     unknown   • Penicillins Hives   • Bactrim [Sulfamethoxazole-Trimethoprim] Rash   • Latex Rash   • Sulfa Antibiotics Rash        REVIEW OF SYSTEMS:  • Appetite: Fair []   Good []   Poor []   Weight Loss []  []  Weight Stable   Unavoidable Weight Loss []  Tolerating Tube Feeding [x]    Supplements Provided []   • Constitutional: Negative for fever, chills, diaphoresis or fatigue and weight change.   • HENT: No dysphagia; no  changes to vision/hearing/smell/taste; no epistaxis  • Eyes: Negative for redness and visual disturbance.   • Respiratory: Negative for shortness of breath, chest pain, cough or chest tightness.   • Cardiovascular: Negative for chest pain and palpitations.   • Gastrointestinal: Negative for abdominal distention, abdominal pain and blood in stool.   • Endocrine: Negative for cold intolerance and heat intolerance.   • Genitourinary: Negative for difficulty urinating, dysuria and frequency.Negative for hematuria   • Musculoskeletal: Chronic myalgias and arthralgias  • Integumentary: No open wounds, rash or concerning skin lesions  • Neurological: Negative for syncope, weakness and headaches.   • Hematological: Negative for adenopathy. Does not bruise/bleed easily.   • Immunological: Negative for reported allergies or immunological disorders  • Psychological: No depression, anxiety or suicidal ideation    PHYSICAL EXAMINATION:  VITAL SIGNS: /72  HR 68  RR 16  WEIGHT 132    General Appearance:  [x]  Alert   [x]  Oriented x person  [x]  No acute distress    [x]  Confused  []  Disoriented   []  Comatose   Head:  Atraumatic and normocephalic, without obvious abnormality.   Eyes:          PERRLA, conjunctivae and sclerae normal, no Icterus. No pallor. Extra-occular movements are within normal limits.   Ears:  Ears appear intact with no abnormalities noted.   Throat: No oral lesions, no thrush, oral mucosa moist.   Neck: Supple, trachea midline, no thyromegaly, no carotid bruit.   Back:   No kyphoscoliosis. No tenderness to palpation.   Lungs:   Chest shape is normal.  Audible air exchange noted all lung fields.  No wheezing.    Heart:  Normal S1 and S2, no murmur, no gallop, no rub. No JVD.   Abdomen:   Normal bowel sounds, no masses, no organomegaly. Soft, non-tender, non-distended, no guarding .  G-tube functioning and in good position.     Extremities: Chronic neuromuscular deficits of known CVA, cyanosis or  clubbing.  Frail build.    Pulses: Pulses palpable and equal bilaterally.   Skin: No bleeding or rash.  Generalized dry skin noted.  Age-related atrophy of skin.   Neurologic: [] Normal speech []  Normal mental status    [x] Cranial nerves II through XII intact   [x]  No anosmia [x]  DTR 2+ [x]  Proprioception intact  []  No focal motor/sensory deficits  Chronic neuromuscular/aphasic deficits of CVA     Psych/Mood:        [x]  No acute changes []  Depressed  Urinary:        []  Continent  [x]  Incontinent  []  Retention  []  F/C     []  UTI w/treatment in progress  RECORD REVIEW:   • Consult notes []   • Admission and subsequent notes []   •  [x] I have personally reviewed and interpreted available lab data, radiology studies and ECG obtained at time of visit.  [x] Medication Review: I have reviewed the patients active and prn medications at [facility]     ASSESSMENT   Diagnoses and all orders for this visit:    Acute ischemic right MCA stroke (CMS/HCC)    Aphasia as late effect of cerebrovascular accident (CVA)    Essential hypertension    Gastrostomy status (CMS/HCC)    Hemiparesis affecting right side as late effect of cerebrovascular accident (CVA) (CMS/HCC)    Impaired mobility and ADLs    Oropharyngeal dysphagia    Physical debility        PLAN  Continue supportive care as needed for ADLs/mobility/transfers.  Tolerating tube feeding regimen without difficulty at this time, without reports of residuals.  No reports of any focal/recurrent aspirations, continue aspiration precautions.  Blood pressure is at goal.  Routine surveillance labs as needed.  Noted weight loss of 6 pounds.    [x]  Discussed Patient in detail with nursing/staff, addressed all needs today.     [x]  Plan of Care Reviewed   []   PT/OT Reviewed   []  Order Changes  []   Discharge Plans Reviewed         Total face to face time spent with patient 25 minutes, 15 min in counseling.      10/17/18

## 2019-03-29 NOTE — PROGRESS NOTES
Nursing Home Progress Note      Patient Name: Luba Landaverde   YOB: 1935    Date of Service: 10/31/2018     Morales Chris, DO [x]   Bridgette Montoya, APRN ?  [address] [place] [address]  Phone: [phone]  Fax: [phone] Ngoc Onofre MD ?  Shane Barber, DO ?  [address] [place] [address]  Phone: [phone]  Fax: [phone]       Facility: [place] []   [place] []   [facility] []   [facility] [x]   Other []       CHIEF COMPLAINT:  CMM/LTC     HISTORY OF PRESENT ILLNESS:  [x]  Follow Up visit for coordination of long term care issues and chronic medical management needs.    Hypertension/oral pharyngeal dysphasia/hemiparesis of right side secondary to CVA/aphasia secondary to CVA/impaired mobility and ADLs    PAST MEDICAL & SURGICAL HISTORY:  Past Medical History:   Diagnosis Date   • Cancer (CMS/HCC)     breast cancer, ~2000, surgery only and tamoxifen, otherwise no chemo/radiation   • Dysphagia     nothing by mouth since stroke in 2015; via feeding tube    • Dysphagia     since CVA 2015, strict NPO and PEG since. issues with aspiration if ever flat and not raised up. stomach issues with TF on higher rates, last hospitalization earlier in 2018 and cutting TF rate helped.   • H/O: hysterectomy    • Hypertension    • Stroke (CMS/HCC) 2015    L sided weakness       Past Surgical History:   Procedure Laterality Date   • CHOLECYSTECTOMY     • GASTROSTOMY FEEDING TUBE INSERTION     • MASTECTOMY          MEDICATIONS:  Medication administration record for Luba Landaverde reviewed and reconciled today.    ALLERGIES:  Allergies   Allergen Reactions   • Gadolinium Derivatives Anaphylaxis   • Codeine Unknown (See Comments)     Unknown, family thinks possibly trouble breathing   • Morphine Unknown (See Comments)     unknown   • Penicillins Hives   • Bactrim [Sulfamethoxazole-Trimethoprim] Rash   • Latex Rash   • Sulfa Antibiotics Rash        REVIEW OF  SYSTEMS:  • Appetite: Fair []   Good []   Poor []   Weight Loss []  [x]  Weight Stable   Unavoidable Weight Loss []  Tolerating Tube Feeding [x]    Supplements Provided []   • Constitutional: Negative for fever, chills, diaphoresis or fatigue and weight change.   • HENT: No dysphagia; no changes to vision/hearing/smell/taste; no epistaxis  • Eyes: Negative for redness and visual disturbance.   • Respiratory: Negative for shortness of breath, chest pain, cough or chest tightness.   • Cardiovascular: Negative for chest pain and palpitations.   • Gastrointestinal: Negative for abdominal distention, abdominal pain and blood in stool.   • Endocrine: Negative for cold intolerance and heat intolerance.   • Genitourinary: Negative for difficulty urinating, dysuria and frequency.Negative for hematuria   • Musculoskeletal: Chronic myalgias and arthralgias  • Integumentary: No open wounds, rash or concerning skin lesions  • Neurological: Negative for syncope, weakness and headaches.   • Hematological: Negative for adenopathy. Does not bruise/bleed easily.   • Immunological: Negative for reported allergies or immunological disorders  • Psychological: No depression, anxiety or suicidal ideation    PHYSICAL EXAMINATION:  VITAL SIGNS: /62, HR 70 bpm, RR 18, weight 141    General Appearance:  [x]  Alert   [x]  Oriented x person  [x]  No acute distress    [x]  Confused  []  Disoriented   []  Comatose   Head:  Atraumatic and normocephalic, without obvious abnormality.   Eyes:          PERRLA, conjunctivae and sclerae normal, no Icterus. No pallor. Extra-occular movements are within normal limits.   Ears:  Ears appear intact with no abnormalities noted.   Throat: No oral lesions, no thrush, oral mucosa moist.   Neck: Supple, trachea midline, no thyromegaly, no carotid bruit.   Back:   No kyphoscoliosis. No tenderness to palpation.   Lungs:   Chest shape is normal.  Audible air exchange noted all lung fields.  No wheezing.     Heart:  Normal S1 and S2, no murmur, no gallop, no rub. No JVD.   Abdomen:   Normal bowel sounds, no masses, no organomegaly. Soft, non-tender, non-distended, no guarding .  G-tube functioning and in good position.     Extremities: Chronic neuromuscular deficits of known CVA, cyanosis or clubbing.  Frail build.    Pulses: Pulses palpable and equal bilaterally.   Skin: No bleeding or rash.  Generalized dry skin noted.  Age-related atrophy of skin.   Neurologic: [] Normal speech []  Normal mental status    [x] Cranial nerves II through XII intact   [x]  No anosmia [x]  DTR 2+ [x]  Proprioception intact  []  No focal motor/sensory deficits  Chronic neuromuscular/aphasic deficits of CVA     Psych/Mood:        [x]  No acute changes []  Depressed  Urinary:        []  Continent  [x]  Incontinent  []  Retention  []  F/C     []  UTI w/treatment in progress  RECORD REVIEW:   • Consult notes []   • Admission and subsequent notes []   •  [x] I have personally reviewed and interpreted available lab data, radiology studies and ECG obtained at time of visit.  [x] Medication Review: I have reviewed the patients active and prn medications at [facility]     ASSESSMENT   Diagnoses and all orders for this visit:    Impaired mobility and ADLs    Physical debility    Gastrostomy status (CMS/HCC)    Hemiparesis affecting right side as late effect of cerebrovascular accident (CVA) (CMS/HCC)    Aphasia as late effect of cerebrovascular accident (CVA)    Oropharyngeal dysphagia    Essential hypertension        PLAN  Continue supportive care as needed for ADLs/mobility/transfers.  Tolerating tube feeding regimen without difficulty at this time, without reports of residuals per dietary/nursing.  No reports of any focal/recurrent aspirations, continue current aspiration precautions in place.  Blood pressure is at goal, remains hemodynamically asymptomatic.  Routine surveillance labs when needed.  Noted weight gain of 2 pounds  since last visit.    [x]  Discussed Patient in detail with nursing/staff, addressed all needs today.     [x]  Plan of Care Reviewed   []   PT/OT Reviewed   []  Order Changes  []   Discharge Plans Reviewed         Total face to face time spent with patient 20 minutes, 15 min in counseling.    Morales Chris   10/31/2018

## 2019-11-04 NOTE — PROGRESS NOTES
82 yo woman, lives in nursing home, aphasic at baseline. LKW ~1900    Large area of ischemic penumbra in the R MCA territory, e/o prior L MCA stroke.    Not a candidate for intervention based on baseline functional status. Recommend comfort measures.    DONNA Darling RN